# Patient Record
Sex: FEMALE | Race: WHITE | NOT HISPANIC OR LATINO | Employment: FULL TIME | URBAN - METROPOLITAN AREA
[De-identification: names, ages, dates, MRNs, and addresses within clinical notes are randomized per-mention and may not be internally consistent; named-entity substitution may affect disease eponyms.]

---

## 2017-08-30 ENCOUNTER — ALLSCRIPTS OFFICE VISIT (OUTPATIENT)
Dept: OTHER | Facility: OTHER | Age: 42
End: 2017-08-30

## 2017-08-30 DIAGNOSIS — Z12.31 ENCOUNTER FOR SCREENING MAMMOGRAM FOR MALIGNANT NEOPLASM OF BREAST: ICD-10-CM

## 2017-09-06 LAB
ADDITIONAL INFORMATION (HISTORICAL): NORMAL
ADEQUACY: (HISTORICAL): NORMAL
CYTOTECHNOLOGIST: (HISTORICAL): NORMAL
INTERPRETATION (HISTORICAL): NORMAL
LMP (HISTORICAL): NORMAL
PREV. BX: (HISTORICAL): NORMAL
PREV. PAP (HISTORICAL): NORMAL
SOURCE (HISTORICAL): NORMAL

## 2017-12-19 ENCOUNTER — HOSPITAL ENCOUNTER (OUTPATIENT)
Dept: MAMMOGRAPHY | Facility: MEDICAL CENTER | Age: 42
Discharge: HOME | End: 2017-12-19
Payer: COMMERCIAL

## 2017-12-19 DIAGNOSIS — Z12.31 ENCOUNTER FOR SCREENING MAMMOGRAM FOR MALIGNANT NEOPLASM OF BREAST: ICD-10-CM

## 2017-12-19 PROCEDURE — 77063 BREAST TOMOSYNTHESIS BI: CPT

## 2017-12-19 PROCEDURE — G0202 SCR MAMMO BI INCL CAD: HCPCS

## 2018-01-14 VITALS
DIASTOLIC BLOOD PRESSURE: 70 MMHG | SYSTOLIC BLOOD PRESSURE: 100 MMHG | HEIGHT: 63 IN | WEIGHT: 119 LBS | BODY MASS INDEX: 21.09 KG/M2

## 2018-09-05 ENCOUNTER — ANNUAL EXAM (OUTPATIENT)
Dept: OBGYN CLINIC | Facility: CLINIC | Age: 43
End: 2018-09-05
Payer: COMMERCIAL

## 2018-09-05 VITALS
BODY MASS INDEX: 21.17 KG/M2 | DIASTOLIC BLOOD PRESSURE: 70 MMHG | WEIGHT: 124 LBS | SYSTOLIC BLOOD PRESSURE: 112 MMHG | HEIGHT: 64 IN

## 2018-09-05 DIAGNOSIS — Z11.51 SCREENING FOR HPV (HUMAN PAPILLOMAVIRUS): ICD-10-CM

## 2018-09-05 DIAGNOSIS — Z01.419 ENCOUNTER FOR ANNUAL ROUTINE GYNECOLOGICAL EXAMINATION: Primary | ICD-10-CM

## 2018-09-05 DIAGNOSIS — Z30.41 ENCOUNTER FOR SURVEILLANCE OF CONTRACEPTIVE PILLS: ICD-10-CM

## 2018-09-05 DIAGNOSIS — Z92.89 HISTORY OF MAMMOGRAM: ICD-10-CM

## 2018-09-05 PROCEDURE — 87624 HPV HI-RISK TYP POOLED RSLT: CPT | Performed by: OBSTETRICS & GYNECOLOGY

## 2018-09-05 PROCEDURE — S0612 ANNUAL GYNECOLOGICAL EXAMINA: HCPCS | Performed by: OBSTETRICS & GYNECOLOGY

## 2018-09-05 PROCEDURE — G0145 SCR C/V CYTO,THINLAYER,RESCR: HCPCS | Performed by: OBSTETRICS & GYNECOLOGY

## 2018-09-05 RX ORDER — NORGESTIMATE AND ETHINYL ESTRADIOL 7DAYSX3 LO
1 KIT ORAL DAILY
Qty: 84 TABLET | Refills: 3 | Status: SHIPPED | OUTPATIENT
Start: 2018-09-05 | End: 2019-03-04 | Stop reason: SDUPTHER

## 2018-09-05 NOTE — PROGRESS NOTES
Assessment   Annual well-woman exam  Contraceptive management  No new gyn complaints or concerns        Plan   Screening mammography ordered  New OCPs [facility]   All questions answered  Await pap smear results  Subjective here for annual exam     Lisa Solis is a 38 y o  female  1 para 1 who presents for annual exam  Periods are regular every 28-30 days, lasting 3 days  Dysmenorrhea:none  Cyclic symptoms include none  No intermenstrual bleeding, spotting, or discharge  The patient reports that there is not domestic violence in her life  Current contraception: OCP (estrogen/progesterone)  History of abnormal Pap smear: no  Family history of uterine or ovarian cancer: no  Regular self breast exam: yes  History of abnormal mammogram: no  Family history of breast cancer: no  History of abnormal lipids: no  Menstrual History:  OB History     No data available         Menarche age: 17  No LMP recorded  Patient is not currently having periods (Reason: Birth Control)  Review of Systems  Pertinent items are noted in HPI        Objective no acute distress     /70 (BP Location: Right arm, Patient Position: Sitting, Cuff Size: Standard)   Ht 5' 4" (1 626 m)   Wt 56 2 kg (124 lb)   BMI 21 28 kg/m²     General:   alert and oriented, in no acute distress, alert, appears stated age and cooperative   Heart: regular rate and rhythm, S1, S2 normal, no murmur, click, rub or gallop   Lungs: clear to auscultation bilaterally   Abdomen: soft, non-tender, without masses or organomegaly   Vulva: normal, Bartholin's, Urethra, [place]'s normal, female escutcheon   Vagina: normal mucosa, normal discharge   Cervix: multiparous appearance, no cervical motion tenderness and no lesions   Uterus: normal size, mobile, non-tender, normal shape and consistency   Adnexa: normal adnexa   Breast exam bilateral breast exam in the sitting supine position with chaperone present no visible or palpable breast lesions identified no supraclavicular axillary lymphadenopathy noted no nipple discharge

## 2018-09-07 LAB
HPV HR 12 DNA CVX QL NAA+PROBE: NEGATIVE
HPV16 DNA CVX QL NAA+PROBE: POSITIVE
HPV18 DNA CVX QL NAA+PROBE: NEGATIVE

## 2018-09-10 LAB
LAB AP GYN PRIMARY INTERPRETATION: NORMAL
Lab: NORMAL

## 2018-09-17 ENCOUNTER — CLINICAL SUPPORT (OUTPATIENT)
Dept: FAMILY MEDICINE CLINIC | Facility: CLINIC | Age: 43
End: 2018-09-17
Payer: COMMERCIAL

## 2018-09-17 DIAGNOSIS — Z11.1 ENCOUNTER FOR PPD TEST: Primary | ICD-10-CM

## 2018-09-17 PROCEDURE — 86580 TB INTRADERMAL TEST: CPT

## 2018-09-20 ENCOUNTER — CLINICAL SUPPORT (OUTPATIENT)
Dept: FAMILY MEDICINE CLINIC | Facility: CLINIC | Age: 43
End: 2018-09-20

## 2018-09-20 DIAGNOSIS — Z11.1 ENCOUNTER FOR PPD SKIN TEST READING: Primary | ICD-10-CM

## 2018-09-20 LAB
INDURATION: 0 MM
TB SKIN TEST: NEGATIVE

## 2018-09-20 PROCEDURE — RECHECK

## 2018-09-27 ENCOUNTER — PROCEDURE VISIT (OUTPATIENT)
Dept: OBGYN CLINIC | Facility: CLINIC | Age: 43
End: 2018-09-27
Payer: COMMERCIAL

## 2018-09-27 VITALS
DIASTOLIC BLOOD PRESSURE: 65 MMHG | BODY MASS INDEX: 21.51 KG/M2 | HEIGHT: 64 IN | SYSTOLIC BLOOD PRESSURE: 90 MMHG | WEIGHT: 126 LBS

## 2018-09-27 DIAGNOSIS — B97.7 HIGH RISK HPV INFECTION: Primary | ICD-10-CM

## 2018-09-27 PROCEDURE — 88305 TISSUE EXAM BY PATHOLOGIST: CPT | Performed by: PATHOLOGY

## 2018-09-27 PROCEDURE — 57454 BX/CURETT OF CERVIX W/SCOPE: CPT | Performed by: OBSTETRICS & GYNECOLOGY

## 2018-09-27 NOTE — PROGRESS NOTES
Colposcopy  Date/Time: 9/27/2018 4:47 PM  Performed by: Bonny Gray by: Julio Hansen     Consent:     Consent obtained:  Written    Consent given by:  Patient    Procedural risks discussed:  Bleeding and infection    Patient questions answered: yes      Patient agrees, verbalizes understanding, and wants to proceed: yes      Educational handouts given: yes      Instructions and paperwork completed: yes    Pre-procedure:     Pre-procedure timeout performed: yes      Premeds:  Ibuprofen    Prepped with: acetic acid    Indication:     Indication:  ASC-H  Procedure:     Procedure: Colposcopy w/ cervical biopsy and ECC      Under satisfactory analgesia the patient was prepped and draped in the dorsal lithotomy position: yes      [place] speculum was placed in the vagina: yes      Under colposcopic examination the transition zone was seen in entirety: yes      Intracervical block was performed: no      Endocervix was curetted using a Kevorkian curette: yes      Cervical biopsy performed with a cervical biopsy punch: yes      Tampon inserted: no      Monsel's solution was applied: yes      Biopsy(s): yes      Location:  6, 9, 12, 3 o'clock    Specimen to pathology: yes    Post-procedure:     Findings: Bleeding, Friable cervix, Punctation and White epithelium      Impression: High grade cervical dysplasia      Patient tolerance of procedure:   Tolerated well, no immediate complications

## 2018-10-11 ENCOUNTER — OFFICE VISIT (OUTPATIENT)
Dept: OBGYN CLINIC | Facility: CLINIC | Age: 43
End: 2018-10-11
Payer: COMMERCIAL

## 2018-10-11 VITALS
DIASTOLIC BLOOD PRESSURE: 68 MMHG | SYSTOLIC BLOOD PRESSURE: 100 MMHG | WEIGHT: 125.2 LBS | HEIGHT: 64 IN | BODY MASS INDEX: 21.37 KG/M2

## 2018-10-11 DIAGNOSIS — B97.7 HIGH RISK HPV INFECTION: Primary | ICD-10-CM

## 2018-10-11 PROCEDURE — 99213 OFFICE O/P EST LOW 20 MIN: CPT | Performed by: OBSTETRICS & GYNECOLOGY

## 2018-10-11 NOTE — PROGRESS NOTES
Assessment/Plan:    Diagnoses and all orders for this visit:    High risk HPV infection        Subjective:  Here for follow-up and test results     Patient ID: Danita Anne is a 41 y o  female  HPI   81-year-old female recently here for annual exam several weeks ago  Her Pap smear was normal however HPV testing was positive for HPV 16 and patient was here for colposcopy  Patient did have numerous nabothian cyst on her cervix and it was quite friable during the procedure  ECC and 4/4 biopsies were all negative  Discussed options of treatment, surveillance versus active management  Patient was interested in cryocautery  Recommend she return within the next 2 weeks for cryocautery of the cervix  Will follow with serial Pap test over the next year after this has been completed  Review of Systems   All other systems reviewed and are negative  Objective:  No acute distress     Physical Exam   Constitutional: She is oriented to person, place, and time  She appears well-developed and well-nourished  HENT:   Head: Normocephalic  Eyes: Pupils are equal, round, and reactive to light  Neck: Normal range of motion  Genitourinary:   Genitourinary Comments: Pelvic exam deferred   Musculoskeletal: Normal range of motion  Neurological: She is alert and oriented to person, place, and time  Skin: Skin is warm and dry  Psychiatric: She has a normal mood and affect   Her behavior is normal  Judgment and thought content normal

## 2018-10-24 ENCOUNTER — PROCEDURE VISIT (OUTPATIENT)
Dept: OBGYN CLINIC | Facility: CLINIC | Age: 43
End: 2018-10-24
Payer: COMMERCIAL

## 2018-10-24 VITALS
WEIGHT: 124.4 LBS | DIASTOLIC BLOOD PRESSURE: 68 MMHG | SYSTOLIC BLOOD PRESSURE: 110 MMHG | BODY MASS INDEX: 21.24 KG/M2 | HEIGHT: 64 IN

## 2018-10-24 DIAGNOSIS — B97.7 HIGH RISK HPV INFECTION: Primary | ICD-10-CM

## 2018-10-24 PROCEDURE — 57511 CRYOCAUTERY OF CERVIX: CPT | Performed by: OBSTETRICS & GYNECOLOGY

## 2018-10-24 NOTE — PROGRESS NOTES
ProceduresCryocautery of the cervix  High-risk HPV infection  Colposcopy biopsies inconclusive  Patient request cryocautery freezing of her cervix  Freeze x3 minutes  Follow-up x5 minute  Freeze for additional 3 min  Patient tolerated well there were no complications    Return in 2 weeks for follow-up

## 2018-11-07 ENCOUNTER — OFFICE VISIT (OUTPATIENT)
Dept: OBGYN CLINIC | Facility: CLINIC | Age: 43
End: 2018-11-07
Payer: COMMERCIAL

## 2018-11-07 VITALS
BODY MASS INDEX: 21.51 KG/M2 | SYSTOLIC BLOOD PRESSURE: 110 MMHG | WEIGHT: 126 LBS | DIASTOLIC BLOOD PRESSURE: 70 MMHG | HEIGHT: 64 IN

## 2018-11-07 DIAGNOSIS — B97.7 HIGH RISK HPV INFECTION: Primary | ICD-10-CM

## 2018-11-07 PROCEDURE — 99213 OFFICE O/P EST LOW 20 MIN: CPT | Performed by: OBSTETRICS & GYNECOLOGY

## 2018-11-07 NOTE — PROGRESS NOTES
Assessment/Plan:    Diagnoses and all orders for this visit:    High risk HPV infection        Subjective:  Cryocautery follow-up     Patient ID: Perry Mancilla is a 41 y o  female  HPI   Patient identified to have high-risk HPV 18 with negative Pap at her recent annual exam   Follow-up colposcopy was inconclusive  Patient requested treatment with cryocautery  Patient cryocautery 2 weeks ago do well and not having any significant negative side effects  Here for re-examination  Review of Systems   All other systems reviewed and are negative  Objective:  /70, height 5 foot 4, weight 126 lb     Physical Exam   Constitutional: She is oriented to person, place, and time  She appears well-developed and well-nourished  HENT:   Head: Normocephalic  Eyes: Pupils are equal, round, and reactive to light  Neck: Normal range of motion  Genitourinary:   Genitourinary Comments: Pelvic exam  Normal external genitalia  Cervix seems to be healing normally  Nabothian cyst present  No pelvic masses otherwise normal exam     Musculoskeletal: Normal range of motion  Neurological: She is alert and oriented to person, place, and time  Skin: Skin is warm and dry  Psychiatric: She has a normal mood and affect

## 2019-03-04 ENCOUNTER — OFFICE VISIT (OUTPATIENT)
Dept: OBGYN CLINIC | Facility: CLINIC | Age: 44
End: 2019-03-04
Payer: COMMERCIAL

## 2019-03-04 VITALS
HEIGHT: 64 IN | BODY MASS INDEX: 21.34 KG/M2 | SYSTOLIC BLOOD PRESSURE: 114 MMHG | DIASTOLIC BLOOD PRESSURE: 70 MMHG | WEIGHT: 125 LBS

## 2019-03-04 DIAGNOSIS — Z30.41 ENCOUNTER FOR SURVEILLANCE OF CONTRACEPTIVE PILLS: ICD-10-CM

## 2019-03-04 DIAGNOSIS — B97.7 HIGH RISK HPV INFECTION: Primary | ICD-10-CM

## 2019-03-04 PROCEDURE — 87624 HPV HI-RISK TYP POOLED RSLT: CPT | Performed by: OBSTETRICS & GYNECOLOGY

## 2019-03-04 PROCEDURE — 99213 OFFICE O/P EST LOW 20 MIN: CPT | Performed by: OBSTETRICS & GYNECOLOGY

## 2019-03-04 PROCEDURE — 88175 CYTOPATH C/V AUTO FLUID REDO: CPT | Performed by: OBSTETRICS & GYNECOLOGY

## 2019-03-04 RX ORDER — NORGESTIMATE AND ETHINYL ESTRADIOL 7DAYSX3 LO
1 KIT ORAL DAILY
Qty: 84 TABLET | Refills: 3 | Status: SHIPPED | OUTPATIENT
Start: 2019-03-04 | End: 2019-06-10 | Stop reason: SDUPTHER

## 2019-03-04 NOTE — PROGRESS NOTES
Assessment/Plan:    Diagnoses and all orders for this visit:    High risk HPV infection  -     Liquid-based pap, diagnostic    Encounter for surveillance of contraceptive pills  -     norgestimate-ethinyl estradiol (ORTHO TRI-CYCLEN LO) 0 18/0 215/0 25 MG-25 MCG per tablet; Take 1 tablet by mouth daily for 84 days      Subjective:  Here for follow-up     Patient ID: Vanessa Noble is a 41 y o  female  HPI   51-year-old female  1 para 1 with history of recurrent high-risk HPV infection  Patient did have cryocautery done about 3 months ago previous that she had a colposcopy all biopsies were negative for dysplasia  This is her 1st Pap since the cryocautery  She feels well  She is currently on birth control to control her menstrual cycle as not having any problems at this time  Is not currently sexually active  Denies any pelvic pain symptoms  She will return in 3 months for repeat Pap  Review of Systems   All other systems reviewed and are negative  Objective:  /70, 5 foot 4 inches, 125 lb, no acute distress  Physical Exam   Constitutional: She is oriented to person, place, and time  She appears well-developed and well-nourished  HENT:   Head: Normocephalic  Eyes: Pupils are equal, round, and reactive to light  Neck: Normal range of motion  Genitourinary:   Genitourinary Comments: Normal external genitalia  Normal size uterus  Normal cervix  No CMT  No pelvic masses  Repeat Pap taken   Musculoskeletal: Normal range of motion  Neurological: She is alert and oriented to person, place, and time  Skin: Skin is warm and dry  Psychiatric: She has a normal mood and affect

## 2019-03-07 LAB
LAB AP GYN PRIMARY INTERPRETATION: NORMAL
LAB AP LMP: NORMAL
Lab: NORMAL

## 2019-06-10 ENCOUNTER — OFFICE VISIT (OUTPATIENT)
Dept: OBGYN CLINIC | Facility: CLINIC | Age: 44
End: 2019-06-10
Payer: COMMERCIAL

## 2019-06-10 VITALS
HEIGHT: 64 IN | DIASTOLIC BLOOD PRESSURE: 64 MMHG | BODY MASS INDEX: 21.82 KG/M2 | SYSTOLIC BLOOD PRESSURE: 110 MMHG | WEIGHT: 127.8 LBS

## 2019-06-10 DIAGNOSIS — R87.810 CERVICAL HIGH RISK HPV (HUMAN PAPILLOMAVIRUS) TEST POSITIVE: Primary | ICD-10-CM

## 2019-06-10 DIAGNOSIS — Z30.41 ENCOUNTER FOR SURVEILLANCE OF CONTRACEPTIVE PILLS: ICD-10-CM

## 2019-06-10 PROCEDURE — 99213 OFFICE O/P EST LOW 20 MIN: CPT | Performed by: OBSTETRICS & GYNECOLOGY

## 2019-06-10 PROCEDURE — 88175 CYTOPATH C/V AUTO FLUID REDO: CPT | Performed by: OBSTETRICS & GYNECOLOGY

## 2019-06-10 PROCEDURE — 87624 HPV HI-RISK TYP POOLED RSLT: CPT | Performed by: OBSTETRICS & GYNECOLOGY

## 2019-06-10 RX ORDER — NORGESTIMATE AND ETHINYL ESTRADIOL 7DAYSX3 LO
1 KIT ORAL DAILY
Qty: 84 TABLET | Refills: 3 | Status: SHIPPED | OUTPATIENT
Start: 2019-06-10 | End: 2019-12-10 | Stop reason: SDUPTHER

## 2019-06-12 LAB
HPV HR 12 DNA CVX QL NAA+PROBE: NEGATIVE
HPV16 DNA CVX QL NAA+PROBE: NEGATIVE
HPV18 DNA CVX QL NAA+PROBE: NEGATIVE

## 2019-06-18 LAB
LAB AP GYN PRIMARY INTERPRETATION: NORMAL
LAB AP LMP: NORMAL
Lab: NORMAL

## 2019-08-19 ENCOUNTER — OFFICE VISIT (OUTPATIENT)
Dept: FAMILY MEDICINE CLINIC | Facility: CLINIC | Age: 44
End: 2019-08-19
Payer: COMMERCIAL

## 2019-08-19 VITALS
DIASTOLIC BLOOD PRESSURE: 70 MMHG | WEIGHT: 126 LBS | SYSTOLIC BLOOD PRESSURE: 112 MMHG | BODY MASS INDEX: 20.99 KG/M2 | HEART RATE: 76 BPM | RESPIRATION RATE: 16 BRPM | HEIGHT: 65 IN | TEMPERATURE: 98.3 F

## 2019-08-19 DIAGNOSIS — J01.00 ACUTE MAXILLARY SINUSITIS, RECURRENCE NOT SPECIFIED: Primary | ICD-10-CM

## 2019-08-19 PROCEDURE — 3008F BODY MASS INDEX DOCD: CPT | Performed by: FAMILY MEDICINE

## 2019-08-19 PROCEDURE — 1036F TOBACCO NON-USER: CPT | Performed by: FAMILY MEDICINE

## 2019-08-19 PROCEDURE — 99213 OFFICE O/P EST LOW 20 MIN: CPT | Performed by: FAMILY MEDICINE

## 2019-08-19 RX ORDER — AZITHROMYCIN 250 MG/1
TABLET, FILM COATED ORAL
Qty: 6 TABLET | Refills: 0 | Status: SHIPPED | OUTPATIENT
Start: 2019-08-19 | End: 2019-08-23

## 2019-08-19 NOTE — PROGRESS NOTES
Assessment/Plan:  Return to the office in 1 week or sooner p r n  Katherine Khan Diagnoses and all orders for this visit:    Acute maxillary sinusitis, recurrence not specified  Comments:  Z-Geoff and Robitussin DM or Mucinex DM p r n   Increase fluids and rest   Orders:  -     azithromycin (ZITHROMAX) 250 mg tablet; Take 2 tablets today then 1 tablet daily x 4 days          Subjective:      Patient ID: Ina Gaston is a 41 y o  female  Patient started 2 weeks ago with cold symptoms  She complains of nasal congestion productive of yellow mucus, sore throat and cough  Patient has felt feverish and complains of sinus pressure headache  She has treated this with Robitussin DM, Tylenol, ibuprofen and Cherelle pot with some relief  URI    This is a new problem  The current episode started 1 to 4 weeks ago  The problem has been gradually worsening  Associated symptoms include congestion, coughing, headaches, a plugged ear sensation, rhinorrhea, sinus pain and a sore throat  Pertinent negatives include no ear pain, sneezing or wheezing  She has tried acetaminophen, NSAIDs and increased fluids (robitussin DM) for the symptoms  The treatment provided mild relief  The following portions of the patient's history were reviewed and updated as appropriate: allergies, current medications, past family history, past medical history, past social history, past surgical history and problem list     Review of Systems   HENT: Positive for congestion, rhinorrhea, sinus pain and sore throat  Negative for ear pain and sneezing  Respiratory: Positive for cough  Negative for wheezing  Neurological: Positive for headaches  Objective:      /70 (BP Location: Left arm, Patient Position: Sitting, Cuff Size: Adult)   Pulse 76   Temp 98 3 °F (36 8 °C) (Oral)   Resp 16   Ht 5' 4 57" (1 64 m)   Wt 57 2 kg (126 lb)   BMI 21 25 kg/m²          Physical Exam   Constitutional: She is oriented to person, place, and time   She appears well-developed and well-nourished  HENT:   Head: Normocephalic  Right Ear: External ear normal    Left Ear: External ear normal    Positive turbinates swelling with mucoid purulent drainage  Throat postnasal drainage and erythema  Eyes: Conjunctivae are normal  No scleral icterus  Neck: Neck supple  Cardiovascular: Normal rate and regular rhythm  Pulmonary/Chest: Effort normal and breath sounds normal    Abdominal: Soft  There is no tenderness  Musculoskeletal: She exhibits no edema  Lymphadenopathy:     She has no cervical adenopathy  Neurological: She is alert and oriented to person, place, and time  Skin: Skin is warm and dry  Psychiatric: She has a normal mood and affect

## 2019-09-10 ENCOUNTER — OFFICE VISIT (OUTPATIENT)
Dept: OBGYN CLINIC | Facility: CLINIC | Age: 44
End: 2019-09-10
Payer: COMMERCIAL

## 2019-09-10 VITALS
DIASTOLIC BLOOD PRESSURE: 70 MMHG | HEIGHT: 64 IN | SYSTOLIC BLOOD PRESSURE: 120 MMHG | BODY MASS INDEX: 22.09 KG/M2 | WEIGHT: 129.4 LBS

## 2019-09-10 DIAGNOSIS — Z01.419 WOMEN'S ANNUAL ROUTINE GYNECOLOGICAL EXAMINATION: Primary | ICD-10-CM

## 2019-09-10 DIAGNOSIS — N87.0 CERVICAL DYSPLASIA, MILD: ICD-10-CM

## 2019-09-10 DIAGNOSIS — Z86.19 HISTORY OF HUMAN PAPILLOMA VIRUS: ICD-10-CM

## 2019-09-10 DIAGNOSIS — R92.2 DENSE BREAST TISSUE ON MAMMOGRAM: ICD-10-CM

## 2019-09-10 PROCEDURE — S0612 ANNUAL GYNECOLOGICAL EXAMINA: HCPCS | Performed by: OBSTETRICS & GYNECOLOGY

## 2019-09-10 PROCEDURE — G0145 SCR C/V CYTO,THINLAYER,RESCR: HCPCS | Performed by: OBSTETRICS & GYNECOLOGY

## 2019-09-10 PROCEDURE — 87624 HPV HI-RISK TYP POOLED RSLT: CPT | Performed by: OBSTETRICS & GYNECOLOGY

## 2019-09-10 NOTE — PROGRESS NOTES
Assessment   Annual well-woman exam  History of high-risk HPV  Status post cryocautery of the cervix 2018  History of dense breast tissue on mammogram       Plan   Follow-up screening mammography ordered  Repeat Pap in 3 months   All questions answered  Await pap smear results  Subjective here for follow-up Pap and annual exam     Sole Villagran is a 41 y o  female  1 para 1 who presents for annual exam  Periods are regular every 28-30 days, lasting 5 days  Dysmenorrhea:none  Cyclic symptoms include none  No intermenstrual bleeding, spotting, or discharge  The patient reports that there is not domestic violence in her life  Current contraception: OCP (estrogen/progesterone)  History of abnormal Pap smear: yes - high-risk HPV  Family history of uterine or ovarian cancer: no  Regular self breast exam: yes  History of abnormal mammogram: no  Family history of breast cancer: no  History of abnormal lipids: no  Menstrual History:  OB History        2    Para   1    Term                AB        Living           SAB        TAB        Ectopic        Multiple        Live Births                    Menarche age: 17  Patient's last menstrual period was 2019 (exact date)  Review of Systems  Pertinent items are noted in HPI        Objective no acute distress   /70 (BP Location: Right arm, Patient Position: Sitting, Cuff Size: Standard)   Ht 5' 4" (1 626 m)   Wt 58 7 kg (129 lb 6 4 oz)   LMP 2019 (Exact Date)   BMI 22 21 kg/m²     General:   alert and oriented, in no acute distress, alert, appears stated age and cooperative   Heart: regular rate and rhythm, S1, S2 normal, no murmur, click, rub or gallop   Lungs: clear to auscultation bilaterally   Abdomen: soft, non-tender, without masses or organomegaly   Vulva: normal, Bartholin's, Urethra, [place]'s normal, female escutcheon   Vagina: normal mucosa, normal discharge   Cervix: multiparous appearance, no bleeding following Pap, no cervical motion tenderness and no lesions   Uterus: normal size, mobile, non-tender, normal shape and consistency   Adnexa: normal adnexa and no mass, fullness, tenderness   Bilateral breast exam in the sitting and supine position with chaperone present, no visible or palpable breast lesions identified  No breast masses noted  No supraclavicular or axillary lymphadenopathy noted  No nipple discharge  Reviewed self-breast exam techniques  Rectal exam, no rectal masses, normal tone, no hemorrhoids visible or palpable  Hemoccult negative          OB History        2    Para   1    Term                AB        Living           SAB        TAB        Ectopic        Multiple        Live Births

## 2019-09-13 LAB
LAB AP GYN PRIMARY INTERPRETATION: NORMAL
LAB AP LMP: NORMAL
Lab: NORMAL
PATH INTERP SPEC-IMP: NORMAL

## 2019-09-14 DIAGNOSIS — N76.0 BV (BACTERIAL VAGINOSIS): Primary | ICD-10-CM

## 2019-09-14 DIAGNOSIS — B96.89 BV (BACTERIAL VAGINOSIS): Primary | ICD-10-CM

## 2019-09-14 RX ORDER — METRONIDAZOLE 7.5 MG/G
1 GEL VAGINAL
Qty: 5 G | Refills: 0 | Status: SHIPPED | OUTPATIENT
Start: 2019-09-14 | End: 2019-09-19

## 2019-10-29 ENCOUNTER — HOSPITAL ENCOUNTER (OUTPATIENT)
Dept: RADIOLOGY | Age: 44
Discharge: HOME | End: 2019-10-29
Payer: COMMERCIAL

## 2019-10-29 VITALS — WEIGHT: 129 LBS | BODY MASS INDEX: 22.02 KG/M2 | HEIGHT: 64 IN

## 2019-10-29 DIAGNOSIS — Z12.31 ENCOUNTER FOR SCREENING MAMMOGRAM FOR MALIGNANT NEOPLASM OF BREAST: ICD-10-CM

## 2019-10-29 PROCEDURE — 77063 BREAST TOMOSYNTHESIS BI: CPT

## 2019-10-29 PROCEDURE — 77067 SCR MAMMO BI INCL CAD: CPT

## 2019-10-30 DIAGNOSIS — R92.8 ABNORMALITY OF RIGHT BREAST ON SCREENING MAMMOGRAM: Primary | ICD-10-CM

## 2019-11-07 ENCOUNTER — HOSPITAL ENCOUNTER (OUTPATIENT)
Dept: MAMMOGRAPHY | Facility: CLINIC | Age: 44
Discharge: HOME | End: 2019-11-07
Payer: COMMERCIAL

## 2019-11-07 ENCOUNTER — TELEPHONE (OUTPATIENT)
Dept: OBGYN CLINIC | Facility: CLINIC | Age: 44
End: 2019-11-07

## 2019-11-07 ENCOUNTER — HOSPITAL ENCOUNTER (OUTPATIENT)
Dept: ULTRASOUND IMAGING | Facility: CLINIC | Age: 44
Discharge: HOME | End: 2019-11-07
Payer: COMMERCIAL

## 2019-11-07 VITALS — BODY MASS INDEX: 22.02 KG/M2 | HEIGHT: 64 IN | WEIGHT: 129 LBS

## 2019-11-07 DIAGNOSIS — R92.8 ABNORMAL MAMMOGRAM: ICD-10-CM

## 2019-11-07 DIAGNOSIS — R92.8 ABNORMALITY OF BOTH BREASTS ON SCREENING MAMMOGRAPHY: Primary | ICD-10-CM

## 2019-11-07 PROCEDURE — 77065 DX MAMMO INCL CAD UNI: CPT

## 2019-11-07 PROCEDURE — G0279 TOMOSYNTHESIS, MAMMO: HCPCS

## 2019-11-07 PROCEDURE — 76642 ULTRASOUND BREAST LIMITED: CPT

## 2019-11-07 NOTE — TELEPHONE ENCOUNTER
----- Message from [facility],  sent at 11/7/2019 12:44 PM EST -----  Six-month follow-up diagnostic mammography of right breast recommended    Order has been placed in the system

## 2019-11-13 ENCOUNTER — TRANSCRIBE ORDERS (OUTPATIENT)
Dept: ADMINISTRATIVE | Facility: HOSPITAL | Age: 44
End: 2019-11-13

## 2019-11-13 DIAGNOSIS — R92.8 ABNORMALITY OF BOTH BREASTS ON SCREENING MAMMOGRAPHY: Primary | ICD-10-CM

## 2019-12-10 ENCOUNTER — OFFICE VISIT (OUTPATIENT)
Dept: OBGYN CLINIC | Facility: CLINIC | Age: 44
End: 2019-12-10
Payer: COMMERCIAL

## 2019-12-10 VITALS
BODY MASS INDEX: 21.24 KG/M2 | SYSTOLIC BLOOD PRESSURE: 116 MMHG | DIASTOLIC BLOOD PRESSURE: 82 MMHG | HEIGHT: 64 IN | WEIGHT: 124.4 LBS

## 2019-12-10 DIAGNOSIS — B97.7 HIGH RISK HPV INFECTION: ICD-10-CM

## 2019-12-10 DIAGNOSIS — N87.0 CERVICAL DYSPLASIA, MILD: Primary | ICD-10-CM

## 2019-12-10 DIAGNOSIS — Z30.41 ENCOUNTER FOR SURVEILLANCE OF CONTRACEPTIVE PILLS: ICD-10-CM

## 2019-12-10 PROCEDURE — 99213 OFFICE O/P EST LOW 20 MIN: CPT | Performed by: OBSTETRICS & GYNECOLOGY

## 2019-12-10 PROCEDURE — 88175 CYTOPATH C/V AUTO FLUID REDO: CPT | Performed by: OBSTETRICS & GYNECOLOGY

## 2019-12-10 RX ORDER — NORGESTIMATE AND ETHINYL ESTRADIOL 7DAYSX3 LO
1 KIT ORAL DAILY
Qty: 84 TABLET | Refills: 3 | Status: SHIPPED | OUTPATIENT
Start: 2019-12-10 | End: 2020-03-03

## 2019-12-10 NOTE — PROGRESS NOTES
Assessment/Plan:    Diagnoses and all orders for this visit:    Cervical dysplasia, mild    Encounter for surveillance of contraceptive pills  -     norgestimate-ethinyl estradiol (ORTHO TRI-CYCLEN LO) 0 18/0 215/0 25 MG-25 MCG per tablet; Take 1 tablet by mouth daily    High risk HPV infection    Other orders  -     Liquid-based pap, diagnostic        Subjective:  Here for repeat Pap     Patient ID: Fletcher Devine is a 45 y o  female  HPI   57-year-old female  1 para 1 history of cervical dysplasia and high-risk HPV  Patient underwent cryocautery last year in October subsequent follow-up Paps have remained stable and negative  Last 3 Paps HPV was not identified  Repeat Pap today  Patient otherwise on OCPs to regulate her menstrual cycle  Denies pelvic pain dysmenorrhea or dyspareunia  Not currently sexually active  Recommend follow-up 6 months with annual exam     Review of Systems   Respiratory: Negative  Cardiovascular: Negative  Gastrointestinal: Negative  Genitourinary: Negative for dyspareunia and pelvic pain  Neurological: Negative  All other systems reviewed and are negative  Objective:  No acute distress  /82 (BP Location: Right arm, Patient Position: Sitting, Cuff Size: Standard)   Ht 5' 4" (1 626 m)   Wt 56 4 kg (124 lb 6 4 oz)   LMP 2019   BMI 21 35 kg/m²      Physical Exam   Constitutional: She is oriented to person, place, and time  She appears well-developed and well-nourished  HENT:   Head: Normocephalic  Eyes: Pupils are equal, round, and reactive to light  Neck: Normal range of motion  Pulmonary/Chest: Effort normal    Abdominal: Soft  Genitourinary:   Genitourinary Comments: Normal external genitalia  Normal size uterus  Cervix enlarged and irregular with nabothian cyst present  Repeat Pap collected  Normal discharge  Normal bimanual exam  No CMT  No pelvic masses   Musculoskeletal: Normal range of motion     Neurological: She is alert and oriented to person, place, and time  Skin: Skin is warm and dry  Psychiatric: She has a normal mood and affect  Her behavior is normal  Thought content normal    Vitals reviewed

## 2019-12-13 LAB
LAB AP GYN PRIMARY INTERPRETATION: NORMAL
LAB AP LMP: NORMAL
Lab: NORMAL

## 2020-01-15 ENCOUNTER — OFFICE VISIT (OUTPATIENT)
Dept: FAMILY MEDICINE CLINIC | Facility: CLINIC | Age: 45
End: 2020-01-15
Payer: COMMERCIAL

## 2020-01-15 VITALS
WEIGHT: 128 LBS | OXYGEN SATURATION: 97 % | RESPIRATION RATE: 16 BRPM | DIASTOLIC BLOOD PRESSURE: 74 MMHG | HEIGHT: 64 IN | BODY MASS INDEX: 21.85 KG/M2 | HEART RATE: 82 BPM | SYSTOLIC BLOOD PRESSURE: 114 MMHG | TEMPERATURE: 99.6 F

## 2020-01-15 DIAGNOSIS — L25.9 CONTACT DERMATITIS, UNSPECIFIED CONTACT DERMATITIS TYPE, UNSPECIFIED TRIGGER: Primary | ICD-10-CM

## 2020-01-15 PROCEDURE — 99213 OFFICE O/P EST LOW 20 MIN: CPT | Performed by: FAMILY MEDICINE

## 2020-01-15 PROCEDURE — 1036F TOBACCO NON-USER: CPT | Performed by: FAMILY MEDICINE

## 2020-01-15 PROCEDURE — 3008F BODY MASS INDEX DOCD: CPT | Performed by: FAMILY MEDICINE

## 2020-01-15 RX ORDER — IBUPROFEN 800 MG
TABLET ORAL
COMMUNITY

## 2020-01-15 RX ORDER — CALCIUM/MAGNESIUM/ZINC 333-133 MG
1 TABLET ORAL 2 TIMES DAILY
COMMUNITY

## 2020-01-15 RX ORDER — MULTIVITAMIN
1 TABLET ORAL DAILY
COMMUNITY

## 2020-01-15 RX ORDER — TRIAMCINOLONE ACETONIDE 1 MG/G
CREAM TOPICAL 2 TIMES DAILY
Qty: 30 G | Refills: 0 | Status: SHIPPED | OUTPATIENT
Start: 2020-01-15

## 2020-01-15 NOTE — PROGRESS NOTES
Assessment/Plan:  Patient will be started on triamcinolone 0 1% cream apply b i d  P r n  Recommend moisturizing cream daily  Return to the office in 1-2 weeks or call sooner p r n   If symptoms persist discussed referral to Dermatology  Diagnoses and all orders for this visit:    Contact dermatitis, unspecified contact dermatitis type, unspecified trigger  -     triamcinolone (KENALOG) 0 1 % cream; Apply topically 2 (two) times a day    Other orders  -     ASHWAGANDHA PO; Take 450 mg by mouth 2 (two) times a day  -     Omega-3 Fatty Acids (FISH OIL PO); Take by mouth  -     Echinacea 400 MG CAPS; Take 1 capsule by mouth 2 (two) times a day  -     Cholecalciferol (VITAMIN D3) 10 MCG (400 UNIT) CAPS; Take by mouth  -     CALCIUM PO; Take by mouth  -     Multiple Vitamin (MULTIVITAMIN) tablet; Take 1 tablet by mouth daily          Subjective:      Patient ID: Pedro Pablo Hood is a 45 y o  female  Patient complains of itchy, irritated rash on her elbows and hands for the past 5 days  No new soaps or laundry detergents  No known exposures  She has treated this with OTC hydrocortisone cream and anti-itch cream with some relief  Rash   This is a new problem  The current episode started in the past 7 days  The problem has been gradually worsening since onset  The affected locations include the left elbow, right elbow, left hand and right hand  The rash is characterized by redness and itchiness  She was exposed to nothing  Pertinent negatives include no congestion, cough, fatigue, fever or sore throat  Past treatments include topical steroids, anti-itch cream and moisturizer (HC cream)  The treatment provided mild relief  There is no history of allergies, asthma or eczema         The following portions of the patient's history were reviewed and updated as appropriate: allergies, current medications, past family history, past medical history, past social history, past surgical history and problem list     Review of Systems   Constitutional: Negative for fatigue and fever  HENT: Negative for congestion and sore throat  Respiratory: Negative for cough  Skin: Positive for rash  Objective:      /74   Pulse 82   Temp 99 6 °F (37 6 °C) (Tympanic)   Resp 16   Ht 5' 4" (1 626 m)   Wt 58 1 kg (128 lb)   SpO2 97%   BMI 21 97 kg/m²          Physical Exam   Constitutional: She is oriented to person, place, and time  She appears well-developed and well-nourished  No distress  HENT:   Head: Normocephalic  Right Ear: External ear normal    Left Ear: External ear normal    Nose: Nose normal    Mouth/Throat: Oropharynx is clear and moist    Eyes: Conjunctivae are normal  No scleral icterus  Neck: Neck supple  Cardiovascular: Normal rate and regular rhythm  Pulmonary/Chest: Effort normal and breath sounds normal    Abdominal: Soft  There is no tenderness  Musculoskeletal: She exhibits no edema  Lymphadenopathy:     She has no cervical adenopathy  Neurological: She is alert and oriented to person, place, and time  Skin: Skin is warm and dry  Rash noted  Erythematous, papular rash on bilateral elbows and dorsal aspect of hands  Psychiatric: She has a normal mood and affect

## 2020-06-30 ENCOUNTER — ANNUAL EXAM (OUTPATIENT)
Dept: OBGYN CLINIC | Facility: CLINIC | Age: 45
End: 2020-06-30
Payer: COMMERCIAL

## 2020-06-30 VITALS
HEIGHT: 64 IN | WEIGHT: 122 LBS | DIASTOLIC BLOOD PRESSURE: 74 MMHG | BODY MASS INDEX: 20.83 KG/M2 | SYSTOLIC BLOOD PRESSURE: 118 MMHG

## 2020-06-30 DIAGNOSIS — B97.7 HIGH RISK HPV INFECTION: ICD-10-CM

## 2020-06-30 DIAGNOSIS — Z30.41 ENCOUNTER FOR SURVEILLANCE OF CONTRACEPTIVE PILLS: ICD-10-CM

## 2020-06-30 DIAGNOSIS — N87.1 CERVICAL DYSPLASIA, MODERATE: ICD-10-CM

## 2020-06-30 DIAGNOSIS — N92.0 MENORRHAGIA WITH REGULAR CYCLE: Primary | ICD-10-CM

## 2020-06-30 PROCEDURE — 99213 OFFICE O/P EST LOW 20 MIN: CPT | Performed by: OBSTETRICS & GYNECOLOGY

## 2020-06-30 PROCEDURE — 87624 HPV HI-RISK TYP POOLED RSLT: CPT | Performed by: OBSTETRICS & GYNECOLOGY

## 2020-06-30 PROCEDURE — 88175 CYTOPATH C/V AUTO FLUID REDO: CPT | Performed by: OBSTETRICS & GYNECOLOGY

## 2020-06-30 PROCEDURE — 3008F BODY MASS INDEX DOCD: CPT | Performed by: OBSTETRICS & GYNECOLOGY

## 2020-06-30 PROCEDURE — 1036F TOBACCO NON-USER: CPT | Performed by: OBSTETRICS & GYNECOLOGY

## 2020-06-30 RX ORDER — NORGESTIMATE AND ETHINYL ESTRADIOL
1 KIT DAILY
Qty: 84 TABLET | Refills: 1 | Status: SHIPPED | OUTPATIENT
Start: 2020-06-30 | End: 2020-12-15 | Stop reason: SDUPTHER

## 2020-06-30 RX ORDER — NORGESTIMATE AND ETHINYL ESTRADIOL
1 KIT DAILY
COMMUNITY
Start: 2020-05-29 | End: 2020-06-30 | Stop reason: SDUPTHER

## 2020-07-02 LAB
LAB AP GYN PRIMARY INTERPRETATION: NORMAL
LAB AP LMP: NORMAL
Lab: NORMAL

## 2020-08-11 ENCOUNTER — HOSPITAL ENCOUNTER (OUTPATIENT)
Dept: MAMMOGRAPHY | Facility: CLINIC | Age: 45
Discharge: HOME | End: 2020-08-11
Payer: COMMERCIAL

## 2020-08-11 ENCOUNTER — HOSPITAL ENCOUNTER (OUTPATIENT)
Dept: ULTRASOUND IMAGING | Facility: CLINIC | Age: 45
Discharge: HOME | End: 2020-08-11
Payer: COMMERCIAL

## 2020-08-11 VITALS — HEIGHT: 64 IN | BODY MASS INDEX: 20.83 KG/M2 | TEMPERATURE: 97.5 F | WEIGHT: 122 LBS

## 2020-08-11 DIAGNOSIS — R92.8 ABNORMAL FINDING ON MAMMOGRAPHY: ICD-10-CM

## 2020-08-11 DIAGNOSIS — R92.8 ABNORMALITY OF BOTH BREASTS ON SCREENING MAMMOGRAPHY: ICD-10-CM

## 2020-08-11 PROCEDURE — 76642 ULTRASOUND BREAST LIMITED: CPT

## 2020-08-11 PROCEDURE — G0279 TOMOSYNTHESIS, MAMMO: HCPCS

## 2020-08-11 PROCEDURE — 77065 DX MAMMO INCL CAD UNI: CPT

## 2020-08-16 DIAGNOSIS — R92.2 DENSE BREAST TISSUE ON MAMMOGRAM: Primary | ICD-10-CM

## 2020-08-19 ENCOUNTER — TELEPHONE (OUTPATIENT)
Dept: OBGYN CLINIC | Facility: CLINIC | Age: 45
End: 2020-08-19

## 2020-08-19 DIAGNOSIS — Z12.31 SCREENING MAMMOGRAM, ENCOUNTER FOR: ICD-10-CM

## 2020-08-19 DIAGNOSIS — R92.2 DENSE BREAST TISSUE: Primary | ICD-10-CM

## 2020-10-30 ENCOUNTER — TELEPHONE (OUTPATIENT)
Dept: OBGYN CLINIC | Facility: CLINIC | Age: 45
End: 2020-10-30

## 2020-11-03 ENCOUNTER — HOSPITAL ENCOUNTER (OUTPATIENT)
Dept: MAMMOGRAPHY | Facility: CLINIC | Age: 45
Discharge: HOME | End: 2020-11-03
Payer: COMMERCIAL

## 2020-11-03 ENCOUNTER — HOSPITAL ENCOUNTER (OUTPATIENT)
Dept: ULTRASOUND IMAGING | Facility: CLINIC | Age: 45
Discharge: HOME | End: 2020-11-03
Payer: COMMERCIAL

## 2020-11-03 VITALS — TEMPERATURE: 97 F | WEIGHT: 122 LBS | BODY MASS INDEX: 20.83 KG/M2 | HEIGHT: 64 IN

## 2020-11-03 VITALS — TEMPERATURE: 97 F | HEIGHT: 64 IN | BODY MASS INDEX: 20.83 KG/M2 | WEIGHT: 122 LBS

## 2020-11-03 DIAGNOSIS — Z12.31 SCREENING MAMMOGRAM, ENCOUNTER FOR: ICD-10-CM

## 2020-11-03 DIAGNOSIS — R92.2 DENSE BREAST TISSUE: ICD-10-CM

## 2020-11-03 PROCEDURE — 76377 3D RENDER W/INTRP POSTPROCES: CPT

## 2020-11-03 PROCEDURE — 76641 ULTRASOUND BREAST COMPLETE: CPT

## 2020-11-03 PROCEDURE — 77067 SCR MAMMO BI INCL CAD: CPT

## 2020-11-03 PROCEDURE — 77063 BREAST TOMOSYNTHESIS BI: CPT

## 2020-12-15 ENCOUNTER — ANNUAL EXAM (OUTPATIENT)
Dept: OBGYN CLINIC | Facility: CLINIC | Age: 45
End: 2020-12-15
Payer: COMMERCIAL

## 2020-12-15 VITALS
HEIGHT: 64 IN | BODY MASS INDEX: 21.75 KG/M2 | DIASTOLIC BLOOD PRESSURE: 72 MMHG | SYSTOLIC BLOOD PRESSURE: 108 MMHG | WEIGHT: 127.4 LBS

## 2020-12-15 DIAGNOSIS — Z87.410 HISTORY OF CERVICAL DYSPLASIA: ICD-10-CM

## 2020-12-15 DIAGNOSIS — N92.0 MENORRHAGIA WITH REGULAR CYCLE: ICD-10-CM

## 2020-12-15 DIAGNOSIS — Z01.419 WOMEN'S ANNUAL ROUTINE GYNECOLOGICAL EXAMINATION: Primary | ICD-10-CM

## 2020-12-15 DIAGNOSIS — R92.2 DENSE BREAST TISSUE ON MAMMOGRAM: ICD-10-CM

## 2020-12-15 PROCEDURE — 87624 HPV HI-RISK TYP POOLED RSLT: CPT | Performed by: OBSTETRICS & GYNECOLOGY

## 2020-12-15 PROCEDURE — 1036F TOBACCO NON-USER: CPT | Performed by: OBSTETRICS & GYNECOLOGY

## 2020-12-15 PROCEDURE — G0145 SCR C/V CYTO,THINLAYER,RESCR: HCPCS | Performed by: OBSTETRICS & GYNECOLOGY

## 2020-12-15 PROCEDURE — 3008F BODY MASS INDEX DOCD: CPT | Performed by: OBSTETRICS & GYNECOLOGY

## 2020-12-15 PROCEDURE — 99396 PREV VISIT EST AGE 40-64: CPT | Performed by: OBSTETRICS & GYNECOLOGY

## 2020-12-15 RX ORDER — NORGESTIMATE AND ETHINYL ESTRADIOL
1 KIT DAILY
Qty: 84 TABLET | Refills: 3 | Status: SHIPPED | OUTPATIENT
Start: 2020-12-15 | End: 2021-12-21 | Stop reason: SDUPTHER

## 2020-12-18 LAB
HPV HR 12 DNA CVX QL NAA+PROBE: NEGATIVE
HPV16 DNA CVX QL NAA+PROBE: NEGATIVE
HPV18 DNA CVX QL NAA+PROBE: NEGATIVE
LAB AP GYN PRIMARY INTERPRETATION: NORMAL
LAB AP LMP: NORMAL
Lab: NORMAL

## 2021-04-19 ENCOUNTER — TELEMEDICINE (OUTPATIENT)
Dept: FAMILY MEDICINE CLINIC | Facility: CLINIC | Age: 46
End: 2021-04-19
Payer: COMMERCIAL

## 2021-04-19 DIAGNOSIS — R19.7 DIARRHEA, UNSPECIFIED TYPE: Primary | ICD-10-CM

## 2021-04-19 PROCEDURE — 99213 OFFICE O/P EST LOW 20 MIN: CPT | Performed by: FAMILY MEDICINE

## 2021-04-19 NOTE — PROGRESS NOTES
Virtual Regular Visit      Assessment/Plan:    Patient is being sent for COVID-19 testing  Will heed results  Patient to remain on home quarantine and isolation  Patient instructed to increase fluids in the form of ginger ale, Gatorade and then broth soups and chicken noodle and chicken rice soup  Recommend brat/bland diet  Discussed signs and symptoms of dehydration  If bleeding reoccurs patient to call the office and will recommend further evaluation  Follow-up in 24-48 hours or call sooner p r n     Problem List Items Addressed This Visit     None      Visit Diagnoses     Diarrhea, unspecified type    -  Primary               Reason for visit is No chief complaint on file  Encounter provider Eli Cevallos DO    Provider located at [address] [place] [address] [id number]      Recent Visits  No visits were found meeting these conditions  Showing recent visits within past 7 days and meeting all other requirements     Future Appointments  No visits were found meeting these conditions  Showing future appointments within next 150 days and meeting all other requirements        The patient was identified by name and date of birth  Jenna Gresham was informed that this is a telemedicine visit and that the visit is being conducted through [employer] App and patient was informed that this is not a secure, HIPAA-compliant platform  She agrees to proceed     My office door was closed  No one else was in the room  She acknowledged consent and understanding of privacy and security of the video platform  The patient has agreed to participate and understands they can discontinue the visit at any time  Patient is aware this is a billable service  Charissajayjay Tejada is a 39 y o  female  Started yesterday afternoon with stomach cramps and then developed watery diarrhea for 30 minutes  Abdominal cramps did improve after bowel movement    Patient then continued with episodes of watery diarrhea with bloody stools last night and 1st thing this morning at 5:00 a m  Rodney Wise Then patient had a normal formed bowel movement later this morning without any blood  She denies any melena  Patient did see blood on the toilet paper and in the bowl previously  She admits to slight nausea but no vomiting  She denies abdominal pain currently  Patient denies fever and temperature is 98 6  She does admit to chills and sweats and fatigue  Patient denies headache or body aches  She denies cough or shortness of breath  Patient denies loss of sense of smell or taste, rash or red eyes  She denies eating any unusual foods or undercooked foods  No recent travel or ill contacts or known COVID exposure  Patient has been working from home for over a year  She has treated this with crackers, drinking water and tea with phill with some improvement  HPI     Past Medical History:   Diagnosis Date    Abnormal Pap smear of cervix        Past Surgical History:   Procedure Laterality Date    WISDOM TOOTH EXTRACTION         Current Outpatient Medications   Medication Sig Dispense Refill    ASHWAGANDHA PO Take 450 mg by mouth 2 (two) times a day      CALCIUM PO Take by mouth      Cholecalciferol (VITAMIN D3) 10 MCG (400 UNIT) CAPS Take by mouth      Echinacea 400 MG CAPS Take 1 capsule by mouth 2 (two) times a day      Multiple Vitamin (MULTIVITAMIN) tablet Take 1 tablet by mouth daily      Omega-3 Fatty Acids (FISH OIL PO) Take by mouth      Tri-Lo-Lizette 0 18/0 215/0 25 MG-25 MCG per tablet Take 1 tablet by mouth daily 84 tablet 3    triamcinolone (KENALOG) 0 1 % cream Apply topically 2 (two) times a day (Patient not taking: Reported on 6/30/2020) 30 g 0     No current facility-administered medications for this visit  No Known Allergies    Review of Systems    Video Exam    There were no vitals filed for this visit      Physical Exam  Constitutional:       General: She is not in acute distress  Appearance: Normal appearance  She is not ill-appearing, toxic-appearing or diaphoretic  HENT:      Head: Normocephalic  Mouth/Throat:      Mouth: Mucous membranes are moist    Eyes:      General: No scleral icterus  Conjunctiva/sclera: Conjunctivae normal    Pulmonary:      Effort: Pulmonary effort is normal       Comments: Patient is talking in complete sentences without shortness of breath or cough  Neurological:      Mental Status: She is alert and oriented to person, place, and time  Psychiatric:         Mood and Affect: Mood normal          Behavior: Behavior normal          Thought Content: Thought content normal          Judgment: Judgment normal       Comments: Patient is smiling and cheerful throughout the exam           I spent 20 minutes directly with the patient during this visit      VIRTUAL VISIT [address] acknowledges that she has consented to an online visit or consultation  She understands that the online visit is based solely on information provided by her, and that, in the absence of a face-to-face physical evaluation by the physician, the diagnosis she receives is both limited and provisional in terms of accuracy and completeness  This is not intended to replace a full medical face-to-face evaluation by the physician  Vera Landeros understands and accepts these terms

## 2021-11-05 ENCOUNTER — HOSPITAL ENCOUNTER (OUTPATIENT)
Dept: RADIOLOGY | Facility: IMAGING CENTER | Age: 46
Discharge: HOME | End: 2021-11-05
Payer: COMMERCIAL

## 2021-11-05 VITALS — HEIGHT: 64 IN | WEIGHT: 127 LBS | BODY MASS INDEX: 21.68 KG/M2

## 2021-11-05 DIAGNOSIS — Z12.31 BREAST CANCER SCREENING BY MAMMOGRAM: ICD-10-CM

## 2021-11-05 PROCEDURE — 77063 BREAST TOMOSYNTHESIS BI: CPT

## 2021-11-05 PROCEDURE — 77067 SCR MAMMO BI INCL CAD: CPT

## 2021-12-21 ENCOUNTER — ANNUAL EXAM (OUTPATIENT)
Dept: OBGYN CLINIC | Facility: CLINIC | Age: 46
End: 2021-12-21
Payer: COMMERCIAL

## 2021-12-21 VITALS
WEIGHT: 129.4 LBS | SYSTOLIC BLOOD PRESSURE: 120 MMHG | DIASTOLIC BLOOD PRESSURE: 84 MMHG | BODY MASS INDEX: 22.09 KG/M2 | HEIGHT: 64 IN

## 2021-12-21 DIAGNOSIS — Z12.31 ENCOUNTER FOR SCREENING MAMMOGRAM FOR BREAST CANCER: Primary | ICD-10-CM

## 2021-12-21 DIAGNOSIS — N92.0 MENORRHAGIA WITH REGULAR CYCLE: ICD-10-CM

## 2021-12-21 DIAGNOSIS — Z30.41 ENCOUNTER FOR SURVEILLANCE OF CONTRACEPTIVE PILLS: ICD-10-CM

## 2021-12-21 PROCEDURE — 1036F TOBACCO NON-USER: CPT | Performed by: OBSTETRICS & GYNECOLOGY

## 2021-12-21 PROCEDURE — 3008F BODY MASS INDEX DOCD: CPT | Performed by: OBSTETRICS & GYNECOLOGY

## 2021-12-21 PROCEDURE — 99396 PREV VISIT EST AGE 40-64: CPT | Performed by: OBSTETRICS & GYNECOLOGY

## 2021-12-21 RX ORDER — NORGESTIMATE AND ETHINYL ESTRADIOL
1 KIT DAILY
Qty: 84 TABLET | Refills: 3 | Status: SHIPPED | OUTPATIENT
Start: 2021-12-21 | End: 2022-03-15

## 2022-12-01 ENCOUNTER — HOSPITAL ENCOUNTER (OUTPATIENT)
Dept: RADIOLOGY | Facility: IMAGING CENTER | Age: 47
End: 2022-12-01

## 2022-12-01 VITALS — BODY MASS INDEX: 23.05 KG/M2 | HEIGHT: 64 IN | WEIGHT: 135 LBS

## 2022-12-01 DIAGNOSIS — Z12.31 ENCOUNTER FOR SCREENING MAMMOGRAM FOR BREAST CANCER: ICD-10-CM

## 2023-01-17 ENCOUNTER — ANNUAL EXAM (OUTPATIENT)
Dept: GYNECOLOGY | Facility: CLINIC | Age: 48
End: 2023-01-17

## 2023-01-17 VITALS
HEIGHT: 64 IN | BODY MASS INDEX: 22.67 KG/M2 | SYSTOLIC BLOOD PRESSURE: 110 MMHG | DIASTOLIC BLOOD PRESSURE: 70 MMHG | WEIGHT: 132.8 LBS

## 2023-01-17 DIAGNOSIS — Z12.11 SCREENING FOR COLON CANCER: ICD-10-CM

## 2023-01-17 DIAGNOSIS — Z01.419 WOMEN'S ANNUAL ROUTINE GYNECOLOGICAL EXAMINATION: Primary | ICD-10-CM

## 2023-01-17 DIAGNOSIS — N92.0 MENORRHAGIA WITH REGULAR CYCLE: ICD-10-CM

## 2023-01-17 DIAGNOSIS — Z12.4 SCREENING FOR CERVICAL CANCER: ICD-10-CM

## 2023-01-17 DIAGNOSIS — Z12.31 ENCOUNTER FOR SCREENING MAMMOGRAM FOR BREAST CANCER: ICD-10-CM

## 2023-01-17 RX ORDER — NORGESTIMATE AND ETHINYL ESTRADIOL
1 KIT DAILY
Qty: 84 TABLET | Refills: 3 | Status: SHIPPED | OUTPATIENT
Start: 2023-01-17 | End: 2023-04-11

## 2023-01-17 NOTE — PROGRESS NOTES
Assessment     Annual well-woman exam    Contraceptive management    History of migraine headaches    History of cervical dysplasia        Plan     Screening mammogram ordered    Colon cancer screening, Cologuard ordered, brochure given    Renew OCPs    Follow-up 1 year p r n  Discussed diet exercise   All questions answered  Await pap smear results  Subjective  Here for annual exam     Martin Santiago is a 41 y o  female who presents for annual exam  Periods are regular every 28-30 days, lasting 4 days  Dysmenorrhea:none  Cyclic symptoms include none  No intermenstrual bleeding, spotting, or discharge  The patient reports that there is not domestic violence in her life  Current contraception: OCP (estrogen/progesterone)  History of abnormal Pap smear: yes -  History of cervical dysplasia  Family history of uterine or ovarian cancer: no  Regular self breast exam: yes  History of abnormal mammogram: no  Family history of breast cancer: no  History of abnormal lipids: no  Menstrual History:  OB History        2    Para   1    Term   1            AB        Living           SAB        IAB        Ectopic        Multiple        Live Births                    Menarche age: 17  Patient's last menstrual period was 2022 (exact date)  Review of Systems  Pertinent items are noted in HPI        Objective  No acute distress   /70 (BP Location: Right arm, Patient Position: Sitting, Cuff Size: Standard)   Ht 5' 4" (1 626 m)   Wt 60 2 kg (132 lb 12 8 oz)   LMP 2022 (Exact Date)   BMI 22 80 kg/m²     General:   alert and oriented, in no acute distress, alert, appears stated age and cooperative   Heart: regular rate and rhythm, S1, S2 normal, no murmur, click, rub or gallop   Lungs: clear to auscultation bilaterally   Abdomen: soft, non-tender, without masses or organomegaly   Vulva: normal, Bartholin's, Urethra, [place]'s normal, female escutcheon   Vagina: normal mucosa, normal discharge, no palpable nodules   Cervix: nabothian cyst and no bleeding following Pap   Uterus: anteverted, mobile, non-tender, normal shape and consistency   Adnexa: normal adnexa and no mass, fullness, tenderness   Bilateral breast exam in the sitting and supine position with chaperone present, no visible or palpable breast lesions identified  No breast masses noted  No supraclavicular or axillary lymphadenopathy noted  No nipple discharge  Reviewed self-breast exam techniques     Rectal exam,  deferred

## 2023-01-25 LAB
LAB AP GYN PRIMARY INTERPRETATION: NORMAL
LAB AP LMP: NORMAL
Lab: NORMAL

## 2023-02-12 LAB — COLOGUARD RESULT REPORTABLE: NEGATIVE

## 2023-04-27 ENCOUNTER — TELEPHONE (OUTPATIENT)
Dept: GYNECOLOGY | Facility: CLINIC | Age: 48
End: 2023-04-27

## 2023-04-27 DIAGNOSIS — N92.1 MENORRHAGIA WITH IRREGULAR CYCLE: Primary | ICD-10-CM

## 2023-04-27 DIAGNOSIS — N92.1 BREAKTHROUGH BLEEDING ON BIRTH CONTROL PILLS: ICD-10-CM

## 2023-04-27 NOTE — TELEPHONE ENCOUNTER
Patient wanted Dr Ky Obrien advice left on her voicemail  Left detailed message advising patient to continue BCP as directed, to got for blood work as ordered, all instructions given, and to follow up on 5/8/23 in [facility] office for [facility] and appointment with Dr Sammy Adler

## 2023-04-27 NOTE — TELEPHONE ENCOUNTER
Patient called c/o LMP 4/13/23 and still bleeding, light flow, 1 pad per day which is her normal   She had 1 heavy day on 4/15/23 but light since  No cramping, discharge, or foul odor  She is taking her BCP with compliance and did not miss any pills  She states this never happened before  Questions what to do  Her Yearly exam was on 1/17/23

## 2023-05-01 ENCOUNTER — OFFICE VISIT (OUTPATIENT)
Dept: FAMILY MEDICINE CLINIC | Facility: CLINIC | Age: 48
End: 2023-05-01

## 2023-05-01 VITALS
BODY MASS INDEX: 23.35 KG/M2 | HEIGHT: 64 IN | WEIGHT: 136.8 LBS | SYSTOLIC BLOOD PRESSURE: 114 MMHG | HEART RATE: 92 BPM | RESPIRATION RATE: 16 BRPM | OXYGEN SATURATION: 97 % | DIASTOLIC BLOOD PRESSURE: 72 MMHG | TEMPERATURE: 100 F

## 2023-05-01 DIAGNOSIS — J02.9 PHARYNGITIS, UNSPECIFIED ETIOLOGY: Primary | ICD-10-CM

## 2023-05-01 LAB — S PYO AG THROAT QL: NEGATIVE

## 2023-05-01 RX ORDER — AZITHROMYCIN 250 MG/1
TABLET, FILM COATED ORAL
Qty: 6 TABLET | Refills: 0 | Status: SHIPPED | OUTPATIENT
Start: 2023-05-01 | End: 2023-05-05

## 2023-05-01 NOTE — PROGRESS NOTES
Name: Dev Puga      : 1975      MRN: [id number]  Encounter Provider: Jessi Estrella DO  Encounter Date: 2023   Encounter department: [address] [place] [address]   Rapid strep test is negative  Patient be started on a Z-Geoff and may continue Tylenol or Motrin as needed  She may take Robitussin-DM as needed  Recommend increased fluids and rest   Return the office in 1 week or call sooner as needed  1  Pharyngitis, unspecified etiology  -     POCT rapid strepA  -     azithromycin (ZITHROMAX) 250 mg tablet; Take 2 tablets today then 1 tablet daily x 4 days           Subjective      Patient started 2 days ago with sore throat and painful swallowing  She admits to a low-grade fever  Patient admits to congestion and cough  She has treated this with Tylenol and Robitussin-DM without significant relief  Sore Throat   This is a new problem  The current episode started in the past 7 days  The problem has been gradually worsening  The maximum temperature recorded prior to her arrival was 100 4 - 100 9 F  Associated symptoms include congestion, coughing and a hoarse voice  Pertinent negatives include no ear pain, headaches, shortness of breath or trouble swallowing  She has had no exposure to strep or mono  She has tried acetaminophen (robitussin DM) for the symptoms  The treatment provided mild relief  Review of Systems   HENT: Positive for congestion, hoarse voice and sore throat  Negative for ear pain and trouble swallowing  Respiratory: Positive for cough  Negative for shortness of breath  Neurological: Negative for headaches         Current Outpatient Medications on File Prior to Visit   Medication Sig    ASHWAGANDHA PO Take 450 mg by mouth 2 (two) times a day    CALCIUM PO Take by mouth    Cholecalciferol (VITAMIN D3) 10 MCG (400 UNIT) CAPS Take by mouth    Echinacea 400 MG CAPS Take 1 capsule by mouth 2 (two) times a day    Multiple Vitamin "(MULTIVITAMIN) tablet Take 1 tablet by mouth daily    Omega-3 Fatty Acids (FISH OIL PO) Take by mouth    Tri-Lo-Lizette 0 18/0 215/0 25 MG-25 MCG per tablet Take 1 tablet by mouth daily    triamcinolone (KENALOG) 0 1 % cream Apply topically 2 (two) times a day (Patient not taking: Reported on 6/30/2020)       Objective     /72   Pulse 92   Temp 100 °F (37 8 °C) (Oral)   Resp 16   Ht 5' 4\" (1 626 m)   Wt 62 1 kg (136 lb 12 8 oz)   SpO2 97%   BMI 23 48 kg/m²     Physical Exam  Constitutional:       General: She is not in acute distress  Appearance: She is well-developed  She is ill-appearing  She is not toxic-appearing or diaphoretic  HENT:      Head: Normocephalic  Right Ear: Tympanic membrane normal       Left Ear: Tympanic membrane normal       Nose: No congestion  Mouth/Throat:      Mouth: Mucous membranes are moist       Pharynx: Posterior oropharyngeal erythema present  No oropharyngeal exudate  Eyes:      Conjunctiva/sclera: Conjunctivae normal    Cardiovascular:      Rate and Rhythm: Normal rate and regular rhythm  Pulmonary:      Effort: Pulmonary effort is normal       Breath sounds: Normal breath sounds  Abdominal:      General: Bowel sounds are normal       Palpations: Abdomen is soft  Musculoskeletal:      Cervical back: Neck supple  Lymphadenopathy:      Cervical: No cervical adenopathy  Skin:     General: Skin is warm  Neurological:      General: No focal deficit present  Mental Status: She is alert     Psychiatric:         Mood and Affect: Mood normal          Behavior: Behavior normal        Dipti Goodcarolina, DO  "

## 2023-05-08 ENCOUNTER — OFFICE VISIT (OUTPATIENT)
Dept: GYNECOLOGY | Facility: CLINIC | Age: 48
End: 2023-05-08

## 2023-05-08 ENCOUNTER — ULTRASOUND (OUTPATIENT)
Dept: GYNECOLOGY | Facility: CLINIC | Age: 48
End: 2023-05-08

## 2023-05-08 VITALS
WEIGHT: 137.6 LBS | DIASTOLIC BLOOD PRESSURE: 78 MMHG | HEIGHT: 63 IN | SYSTOLIC BLOOD PRESSURE: 122 MMHG | BODY MASS INDEX: 24.38 KG/M2

## 2023-05-08 DIAGNOSIS — D21.9 FIBROIDS: Primary | ICD-10-CM

## 2023-05-08 DIAGNOSIS — Z87.410 HISTORY OF CERVICAL DYSPLASIA: ICD-10-CM

## 2023-05-08 DIAGNOSIS — Z30.41 ENCOUNTER FOR SURVEILLANCE OF CONTRACEPTIVE PILLS: ICD-10-CM

## 2023-05-08 DIAGNOSIS — Z86.69 HISTORY OF MIGRAINE HEADACHES: ICD-10-CM

## 2023-05-08 DIAGNOSIS — N92.1 MENORRHAGIA WITH IRREGULAR CYCLE: Primary | ICD-10-CM

## 2023-05-08 DIAGNOSIS — D25.1 FIBROIDS, INTRAMURAL: ICD-10-CM

## 2023-05-08 NOTE — PROGRESS NOTES
AMB US Pelvic Non OB    Date/Time: 5/8/2023 9:40 AM  Performed by: Zuleima Whittington  Authorized by: Susan Dixon DO   Universal Protocol:  Consent given by: patient  Patient identity confirmed: verbally with patient      Procedure details:     Indications: non-obstetric vaginal bleeding      Technique:  Transvaginal US, Non-OB    Position: lithotomy exam    Uterine findings:     Length (cm): 7 93    Height (cm):  3 62    Width (cm):  6 25    Endometrial stripe: identified      Endometrium thickness (mm):  5 5  Left ovary findings:     Left ovary:  Visualized    Length (cm): 1 88    Height (cm): 1 21    Width (cm): 1 41  Right ovary findings:     Right ovary:  Visualized    Length (cm): 2 1    Height (cm): 0 73    Width (cm): 1 14  Other findings:     Free pelvic fluid: not identified      Free peritoneal fluid: not identified    Post-Procedure Details:     Impression:  Anteverted uterus demonstrates multiple fibroids  Largest are right anterior intramural with possible submucosal extension 1 5cm, left anterior intramural 9 3mm, and posterior intramural 7 3mm  There are multiple nabothian cysts noted in the cervix  Bilateral ovaries appear within normal limits  No free fluid  Tolerance: Tolerated well, no immediate complications    Complications: no complications    Additional Procedure Comments:      [employer] F8 E8C-RS transvaginal transducer Serial # [id number] was used to perform the examination today and subsequently followed with high level disinfection utilizing Trophon EPR procedure  Ultrasound performed at:     [address] [address]  Phone:  [phone]  Fax:  [phone]

## 2023-05-08 NOTE — PROGRESS NOTES
"Assessment/Plan:    Diagnoses and all orders for this visit:    Menorrhagia with irregular cycle    Fibroids, intramural    History of migraine headaches    History of cervical dysplasia    Encounter for surveillance of contraceptive pills        Subjective: Menorrhagia     Patient ID: Duy Leo is a 41 y o  female  HPI   73-year-old female  1 para 1 recently here in January for her annual exam   She is on oral contraceptives mainly to regulate her menstrual cycle  She is not currently sexually active  She has been on birth control for many years  She also is a history of migraine headaches  Her most recent menstrual cycle lasted for 14 to 15 days  She had severe significant menstrual migraine during that time as well she could not eat and she was vomiting,\" worst she is ever had\"  We did a pelvic ultrasound today which revealed multiple uterine fibroids  Pelvic ultrasound anteverted uterus demonstrating multiple fibroids  Largest are right anterior intramural with possible submucosal extension at 1-1/2 cm  Left anterior intramural only 9 3 mm and posterior intramural 7 3 mm  Endometrial thickness is bilateral ovaries appear to be within normal limits there is no free fluid noted  Recommend she return in several weeks for endometrial biopsy  Risks and benefits reviewed  Discussed the possibility of endometrial hyperplasia  Hopefully this is a one-time event however we have to rule out any other uterine pathology  Review of Systems  Unchanged from previous visit    Objective: No acute distress  /78 (BP Location: Left arm, Patient Position: Sitting, Cuff Size: Standard)   Ht 5' 3\" (1 6 m)   Wt 62 4 kg (137 lb 9 6 oz)   LMP 2023 (Exact Date)   BMI 24 37 kg/m²      Physical Exam  Vitals reviewed  Constitutional:       Appearance: Normal appearance  She is normal weight  HENT:      Head: Normocephalic  Eyes:      Extraocular Movements: Extraocular movements intact      " Pupils: Pupils are equal, round, and reactive to light  Pulmonary:      Effort: Pulmonary effort is normal    Genitourinary:     Comments: Pelvic exam deferred  Musculoskeletal:         General: Normal range of motion  Cervical back: Normal range of motion  Neurological:      Mental Status: She is alert and oriented to person, place, and time  Psychiatric:         Mood and Affect: Mood normal          Behavior: Behavior normal          Thought Content: Thought content normal          Judgment: Judgment normal         Please note    In addition to the time spent discussing the findings and results of today's visit and exam, I spent approximately 20 minutes of face-to-face time with the patient, greater than 50% of which was spent in counseling and coordination of care for this patient

## 2023-05-24 ENCOUNTER — PROCEDURE VISIT (OUTPATIENT)
Dept: GYNECOLOGY | Facility: CLINIC | Age: 48
End: 2023-05-24

## 2023-05-24 VITALS
BODY MASS INDEX: 24.1 KG/M2 | DIASTOLIC BLOOD PRESSURE: 70 MMHG | SYSTOLIC BLOOD PRESSURE: 120 MMHG | WEIGHT: 136 LBS | HEIGHT: 63 IN

## 2023-05-24 DIAGNOSIS — Z30.41 ENCOUNTER FOR SURVEILLANCE OF CONTRACEPTIVE PILLS: ICD-10-CM

## 2023-05-24 DIAGNOSIS — N92.0 MENORRHAGIA WITH REGULAR CYCLE: Primary | ICD-10-CM

## 2023-05-24 DIAGNOSIS — R93.89 THICKENED ENDOMETRIUM: ICD-10-CM

## 2023-05-24 NOTE — PROGRESS NOTES
Endometrial biopsy    Date/Time: 5/24/2023 9:20 AM    Performed by: Nasreen Vázquez DO  Authorized by: Nasreen Vázquez DO  Universal Protocol:  Consent: Written consent obtained  Risks and benefits: risks, benefits and alternatives were discussed  Consent given by: patient  Patient understanding: patient states understanding of the procedure being performed  Patient consent: the patient's understanding of the procedure matches consent given  Procedure consent: procedure consent matches procedure scheduled  Patient identity confirmed: verbally with patient      Indication:     Indications: Other disorder of menstruation and other abnormal bleeding from female genital tract    Pre-procedure:     Premeds:  Ibuprofen  Procedure:     Procedure: endometrial biopsy with Pipelle      A bivalve speculum was placed in the vagina: yes      Cervix cleaned and prepped: yes      A paracervical block was performed: no      An intracervical block was performed: no      The cervix was dilated: no      Uterus sounded: yes      Uterus sound depth (cm):  7    Curettes used:  1    Specimen collected: specimen collected and sent to pathology    Findings:     Uterus size:  6-8 weeks    Cervix: normal      Adnexa: normal    Comments:     Procedure comments:  Patient tolerated EMB without difficulty will return the upcoming weeks to discuss follow-up plans to possibly schedule surgery

## 2023-05-24 NOTE — PROGRESS NOTES
Assessment/Plan:    Diagnoses and all orders for this visit:    Menorrhagia with regular cycle  -     Tissue Exam    Thickened endometrium  -     Tissue Exam    Encounter for surveillance of contraceptive pills        Subjective: Here for endometrial biopsy and plan of care     Patient ID: Vic Velasco is a 41 y o  female  HPI   44-year-old female  1 para 1 recently here in January of this past year for annual exam   She is on oral contraceptives mainly to regulate her menstrual cycle  She is not currently sexually active and she has been on birth control pills for many years  She also has a history of migraine headaches which can be severe at times  Last month she had a menstrual cycle that lasted for 14 to 15 days  She also suffered significant menstrual migraine at the same time, where she never had  Pelvic ultrasound done at that visit revealed uterine fibroids  Largest are right anterior intramural with possible submucosal extension at 1-1/2 cm  Left anterior intramural only 9 3 mm and posterior intramural 7 3 mm  Endometrial thickness was noted to be normal at 5 5 mm  I recommended endometrial biopsy that is why she is here today  Biopsy dictated separately  We did again review the ultrasound findings and suggested plan of care  She may have ongoing irregular menses which may be heavy at times because of the submucosal extension of one of her fibroids  I recommended she consider Olimpia endometrial ablation and bilateral salpingectomy for permanent sterilization  Risks and benefits briefly reviewed with the patient  Brochure given to patient  She will return in the upcoming weeks to discuss biopsy findings and ongoing plan of care  All questions answered  She is concerned about weight gain which may be aggravated by OCPs  She does try to eat a healthy diet and exercise she recently bought a treadmill      Review of Systems  Unchanged from previous visit    Objective: No acute "distress  /70   Ht 5' 3\" (1 6 m)   Wt 61 7 kg (136 lb)   LMP 05/15/2023 (Exact Date)   BMI 24 09 kg/m²      Physical Exam  Vitals and nursing note reviewed  Exam conducted with a chaperone present  Constitutional:       Appearance: Normal appearance  She is normal weight  HENT:      Head: Normocephalic  Eyes:      Pupils: Pupils are equal, round, and reactive to light  Pulmonary:      Effort: Pulmonary effort is normal    Abdominal:      General: Abdomen is flat  Palpations: Abdomen is soft  Genitourinary:     General: Normal vulva  Musculoskeletal:         General: Normal range of motion  Skin:     General: Skin is warm and dry  Neurological:      Mental Status: She is alert and oriented to person, place, and time  Psychiatric:         Mood and Affect: Mood normal          Behavior: Behavior normal          Thought Content: Thought content normal          Judgment: Judgment normal         Please note    In addition to the time spent discussing the findings and results of today's visit and exam, I spent approximately 30 minutes of face-to-face time with the patient, greater than 50% of which was spent in counseling and coordination of care for this patient    "

## 2023-06-13 ENCOUNTER — OFFICE VISIT (OUTPATIENT)
Dept: GYNECOLOGY | Facility: CLINIC | Age: 48
End: 2023-06-13
Payer: COMMERCIAL

## 2023-06-13 VITALS
BODY MASS INDEX: 23.88 KG/M2 | SYSTOLIC BLOOD PRESSURE: 114 MMHG | DIASTOLIC BLOOD PRESSURE: 72 MMHG | HEIGHT: 63 IN | WEIGHT: 134.8 LBS

## 2023-06-13 DIAGNOSIS — D25.1 FIBROIDS, INTRAMURAL: ICD-10-CM

## 2023-06-13 DIAGNOSIS — N92.1 MENORRHAGIA WITH IRREGULAR CYCLE: ICD-10-CM

## 2023-06-13 DIAGNOSIS — Z30.41 ENCOUNTER FOR SURVEILLANCE OF CONTRACEPTIVE PILLS: ICD-10-CM

## 2023-06-13 DIAGNOSIS — D25.0 FIBROIDS, SUBMUCOSAL: ICD-10-CM

## 2023-06-13 PROCEDURE — 99213 OFFICE O/P EST LOW 20 MIN: CPT | Performed by: OBSTETRICS & GYNECOLOGY

## 2023-06-13 NOTE — PROGRESS NOTES
"Assessment/Plan:    Diagnoses and all orders for this visit:    Menorrhagia with irregular cycle    Fibroids, submucosal    Fibroids, intramural    Encounter for surveillance of contraceptive pills        Subjective: Here for test results and plan of care     Patient ID: Dat Reyes is a 41 y o  female  HPI   24-year-old female  1 para 1 recently here in January for annual exam   She currently is on oral contraceptives mainly to regulate her menstrual cycle  She is not currently sexually active and she has been on birth control for many years  She has a history of migraine headaches  Recently she had a menstrual cycle that lasted between 14 and 15 days  She has significant menstrual migraine during that time as well as she could not eat and she was vomiting, the worst ever had  Pelvic ultrasound revealed a normal-sized uterus with multiple fibroids both intramural and submucosal fibroids  Endometrial thickness was normal there was no free fluid noted  Because of this change in her menstrual cycles I recommended she have endometrial biopsy  Endometrial biopsy revealed benign proliferative phase endometrium with prominent features of benign polyp, negative for atypia or carcinoma  Discussed  options moving forward she is given information on the Olimpia endometrial ablation  She would also be interested in having bilateral salpingectomy for permanent sterilization  We briefly reviewed the risks and benefits of these procedures  She currently works from home, coparenting with her estranged   She will consider her options and schedule in the upcoming weeks to schedule her surgery and sign consents and preoperative exam   All questions answered      Review of Systems  Unchanged    Objective: No acute distress  /72 (BP Location: Left arm, Patient Position: Sitting, Cuff Size: Standard)   Ht 5' 3\" (1 6 m)   Wt 61 1 kg (134 lb 12 8 oz)   LMP 05/15/2023 (Exact Date)   BMI 23 88 " kg/m²      Physical Exam  Vitals and nursing note reviewed  Constitutional:       Appearance: Normal appearance  She is normal weight  HENT:      Head: Normocephalic  Eyes:      Pupils: Pupils are equal, round, and reactive to light  Pulmonary:      Effort: Pulmonary effort is normal    Genitourinary:     Comments: Pelvic exam deferred  Musculoskeletal:         General: Normal range of motion  Cervical back: Normal range of motion  Skin:     General: Skin is warm and dry  Neurological:      Mental Status: She is alert and oriented to person, place, and time  Psychiatric:         Mood and Affect: Mood normal          Behavior: Behavior normal          Thought Content: Thought content normal          Judgment: Judgment normal         Please note    In addition to the time spent discussing the findings and results of today's visit and exam, I spent approximately 20 minutes of face-to-face time with the patient, greater than 50% of which was spent in counseling and coordination of care for this patient

## 2023-07-14 ENCOUNTER — TELEPHONE (OUTPATIENT)
Dept: GYNECOLOGY | Facility: CLINIC | Age: 48
End: 2023-07-14

## 2023-07-14 NOTE — TELEPHONE ENCOUNTER
I spoke to [address]  at [place] on 7/14/2023 at 3:25 pm no prior authorization is needed for CPT code [id number] being performed on 8/9/2023.

## 2023-07-22 ENCOUNTER — LAB REQUISITION (OUTPATIENT)
Dept: LAB | Facility: HOSPITAL | Age: 48
End: 2023-07-22
Payer: COMMERCIAL

## 2023-07-22 ENCOUNTER — APPOINTMENT (OUTPATIENT)
Dept: LAB | Facility: CLINIC | Age: 48
End: 2023-07-22
Payer: COMMERCIAL

## 2023-07-22 DIAGNOSIS — Z01.818 PRE-OP TESTING: ICD-10-CM

## 2023-07-22 DIAGNOSIS — Z01.818 ENCOUNTER FOR OTHER PREPROCEDURAL EXAMINATION: ICD-10-CM

## 2023-07-22 LAB
ABO GROUP BLD: NORMAL
BLD GP AB SCN SERPL QL: NEGATIVE
ERYTHROCYTE [DISTWIDTH] IN BLOOD BY AUTOMATED COUNT: 12.5 % (ref 11.6–15.1)
EST. AVERAGE GLUCOSE BLD GHB EST-MCNC: 88 MG/DL
HBA1C MFR BLD: 4.7 %
HCT VFR BLD AUTO: 40.1 % (ref 34.8–46.1)
HGB BLD-MCNC: 12.8 G/DL (ref 11.5–15.4)
MCH RBC QN AUTO: 29.4 PG (ref 26.8–34.3)
MCHC RBC AUTO-ENTMCNC: 31.9 G/DL (ref 31.4–37.4)
MCV RBC AUTO: 92 FL (ref 82–98)
PLATELET # BLD AUTO: 287 THOUSANDS/UL (ref 149–390)
PMV BLD AUTO: 11.5 FL (ref 8.9–12.7)
RBC # BLD AUTO: 4.35 MILLION/UL (ref 3.81–5.12)
RH BLD: POSITIVE
SPECIMEN EXPIRATION DATE: NORMAL
WBC # BLD AUTO: 8.44 THOUSAND/UL (ref 4.31–10.16)

## 2023-07-22 PROCEDURE — 86901 BLOOD TYPING SEROLOGIC RH(D): CPT | Performed by: OBSTETRICS & GYNECOLOGY

## 2023-07-22 PROCEDURE — 85027 COMPLETE CBC AUTOMATED: CPT

## 2023-07-22 PROCEDURE — 83036 HEMOGLOBIN GLYCOSYLATED A1C: CPT

## 2023-07-22 PROCEDURE — 86850 RBC ANTIBODY SCREEN: CPT | Performed by: OBSTETRICS & GYNECOLOGY

## 2023-07-22 PROCEDURE — 86900 BLOOD TYPING SEROLOGIC ABO: CPT | Performed by: OBSTETRICS & GYNECOLOGY

## 2023-07-26 ENCOUNTER — OFFICE VISIT (OUTPATIENT)
Dept: GYNECOLOGY | Facility: CLINIC | Age: 48
End: 2023-07-26

## 2023-07-26 VITALS
BODY MASS INDEX: 24.17 KG/M2 | HEIGHT: 63 IN | DIASTOLIC BLOOD PRESSURE: 82 MMHG | WEIGHT: 136.4 LBS | SYSTOLIC BLOOD PRESSURE: 126 MMHG

## 2023-07-26 DIAGNOSIS — D25.1 FIBROIDS, INTRAMURAL: ICD-10-CM

## 2023-07-26 DIAGNOSIS — Z86.69 HISTORY OF MIGRAINE HEADACHES: ICD-10-CM

## 2023-07-26 DIAGNOSIS — Z30.2 REQUEST FOR STERILIZATION: ICD-10-CM

## 2023-07-26 DIAGNOSIS — Z01.818 PREOPERATIVE EXAM FOR GYNECOLOGIC SURGERY: Primary | ICD-10-CM

## 2023-07-26 DIAGNOSIS — D25.0 FIBROIDS, SUBMUCOSAL: ICD-10-CM

## 2023-07-26 DIAGNOSIS — N92.1 MENORRHAGIA WITH IRREGULAR CYCLE: ICD-10-CM

## 2023-07-26 NOTE — H&P
Assessment/Plan:   Diagnoses and all orders for this visit:    Preoperative exam for gynecologic surgery    Menorrhagia with irregular cycle    Request for sterilization    Fibroids, submucosal    Fibroids, intramural    History of migraine headaches       Subjective: Here for preop physical exam    Surgery planned Dagnostic laparoscopy with bilateral salpingectomy                                 History with Olimpia endometrial ablation    Patient ID: Tracy Mccauley is a 41 y.o. female. HPI   63-year-old female  1 para 1 here in January for annual exam.  She is currently on OCPs to regulate her menstrual cycle. She is not currently sexually active and has been on birth control for many years. She does have a history of menstrual type migraine headaches. States her menstrual cycles recently have become longer lasting up to 14 to 15 days. Also again associated with migraine headaches. Pelvic ultrasound normal size uterus measuring 7.9 x 3.62 x 6.25 cm. Normal endometrial thickness at 5.5 mm  Anteverted uterus demonstrates multiple fibroids the largest right anterior intramural with possible submucosal extension at 1.5 cm. There is also left anterior intramural fibroid measuring only 9.3 mm in a posterior intramural omentum with  Multiple nabothian cysts, both ovaries appear to be within normal limits there is no free fluid noted. Endometrial biopsy benign proliferative endometrium with prominent features of benign polyp, negative for atypia or carcinoma. Patient request D&C hysteroscopy with removal.  Also recommend tubal sterilization at that same time so she can stop her OCPs. Procedure risks and benefits reviewed in detail operative consent signed today all questions answered. She is scheduled for 2023 all questions answered.     Review of Systems Unchanged    Objective: No acute distress  /82 (BP Location: Left arm, Patient Position: Sitting, Cuff Size: Standard)   Ht 5' 3" (1.6 m)   Wt 61.9 kg (136 lb 6.4 oz)   BMI 24.16 kg/m²     Physical Exam  Vitals reviewed. Constitutional:       Appearance: Normal appearance. She is normal weight. Eyes:      Pupils: Pupils are equal, round, and reactive to light. Pulmonary:      Effort: Pulmonary effort is normal.   Abdominal:      General: Abdomen is flat. Palpations: Abdomen is soft. Genitourinary:     General: Normal vulva. Comments: Normal external genitalia  Normal size uterus  Normal cervix  No CMT  No pelvic masses  Normal vaginal discharge  Musculoskeletal:         General: Normal range of motion. Cervical back: Normal range of motion. Skin:     General: Skin is warm and dry. Neurological:      Mental Status: She is alert and oriented to person, place, and time. Psychiatric:         Mood and Affect: Mood normal.         Behavior: Behavior normal.         Thought Content: Thought content normal.         Judgment: Judgment normal.       Please note    In addition to the time spent discussing the findings and results of today's visit and exam, I spent approximately 30 minutes of face-to-face time with the patient, greater than 50% of which was spent in counseling and coordination of care for this patient.

## 2023-07-26 NOTE — PROGRESS NOTES
Assessment/Plan:    Diagnoses and all orders for this visit:    Preoperative exam for gynecologic surgery    Menorrhagia with irregular cycle    Request for sterilization    Fibroids, submucosal    Fibroids, intramural    History of migraine headaches        Subjective: Here for preop physical exam    Surgery planned Dagnostic laparoscopy with bilateral salpingectomy                                 History with Olimpia endometrial ablation     Patient ID: Sosa Cole is a 41 y.o. female. HPI   51-year-old female  1 para 1 here in January for annual exam.  She is currently on OCPs to regulate her menstrual cycle. She is not currently sexually active and has been on birth control for many years. She does have a history of menstrual type migraine headaches. States her menstrual cycles recently have become longer lasting up to 14 to 15 days. Also again associated with migraine headaches. Pelvic ultrasound normal size uterus measuring 7.9 x 3.62 x 6.25 cm. Normal endometrial thickness at 5.5 mm  Anteverted uterus demonstrates multiple fibroids the largest right anterior intramural with possible submucosal extension at 1.5 cm. There is also left anterior intramural fibroid measuring only 9.3 mm in a posterior intramural omentum with  Multiple nabothian cysts, both ovaries appear to be within normal limits there is no free fluid noted. Endometrial biopsy benign proliferative endometrium with prominent features of benign polyp, negative for atypia or carcinoma. Patient request D&C hysteroscopy with removal.  Also recommend tubal sterilization at that same time so she can stop her OCPs. Procedure risks and benefits reviewed in detail operative consent signed today all questions answered. She is scheduled for 2023 all questions answered.     Review of Systems  Unchanged    Objective: No acute distress  /82 (BP Location: Left arm, Patient Position: Sitting, Cuff Size: Standard)   Ht 5' 3" (1.6 m)   Wt 61.9 kg (136 lb 6.4 oz)   BMI 24.16 kg/m²      Physical Exam  Vitals reviewed. Constitutional:       Appearance: Normal appearance. She is normal weight. Eyes:      Pupils: Pupils are equal, round, and reactive to light. Pulmonary:      Effort: Pulmonary effort is normal.   Abdominal:      General: Abdomen is flat. Palpations: Abdomen is soft. Genitourinary:     General: Normal vulva. Comments: Normal external genitalia  Normal size uterus  Normal cervix  No CMT  No pelvic masses  Normal vaginal discharge  Musculoskeletal:         General: Normal range of motion. Cervical back: Normal range of motion. Skin:     General: Skin is warm and dry. Neurological:      Mental Status: She is alert and oriented to person, place, and time. Psychiatric:         Mood and Affect: Mood normal.         Behavior: Behavior normal.         Thought Content: Thought content normal.         Judgment: Judgment normal.        Please note    In addition to the time spent discussing the findings and results of today's visit and exam, I spent approximately 30 minutes of face-to-face time with the patient, greater than 50% of which was spent in counseling and coordination of care for this patient.

## 2023-07-26 NOTE — H&P (VIEW-ONLY)
Assessment/Plan:   Diagnoses and all orders for this visit:    Preoperative exam for gynecologic surgery    Menorrhagia with irregular cycle    Request for sterilization    Fibroids, submucosal    Fibroids, intramural    History of migraine headaches       Subjective: Here for preop physical exam    Surgery planned Dagnostic laparoscopy with bilateral salpingectomy                                 History with Olimpia endometrial ablation    Patient ID: Geremias Thao is a 41 y.o. female. HPI   79-year-old female  1 para 1 here in January for annual exam.  She is currently on OCPs to regulate her menstrual cycle. She is not currently sexually active and has been on birth control for many years. She does have a history of menstrual type migraine headaches. States her menstrual cycles recently have become longer lasting up to 14 to 15 days. Also again associated with migraine headaches. Pelvic ultrasound normal size uterus measuring 7.9 x 3.62 x 6.25 cm. Normal endometrial thickness at 5.5 mm  Anteverted uterus demonstrates multiple fibroids the largest right anterior intramural with possible submucosal extension at 1.5 cm. There is also left anterior intramural fibroid measuring only 9.3 mm in a posterior intramural omentum with  Multiple nabothian cysts, both ovaries appear to be within normal limits there is no free fluid noted. Endometrial biopsy benign proliferative endometrium with prominent features of benign polyp, negative for atypia or carcinoma. Patient request D&C hysteroscopy with removal.  Also recommend tubal sterilization at that same time so she can stop her OCPs. Procedure risks and benefits reviewed in detail operative consent signed today all questions answered. She is scheduled for 2023 all questions answered.     Review of Systems Unchanged    Objective: No acute distress  /82 (BP Location: Left arm, Patient Position: Sitting, Cuff Size: Standard)   Ht 5' 3" (1.6 m)   Wt 61.9 kg (136 lb 6.4 oz)   BMI 24.16 kg/m²     Physical Exam  Vitals reviewed. Constitutional:       Appearance: Normal appearance. She is normal weight. Eyes:      Pupils: Pupils are equal, round, and reactive to light. Pulmonary:      Effort: Pulmonary effort is normal.   Abdominal:      General: Abdomen is flat. Palpations: Abdomen is soft. Genitourinary:     General: Normal vulva. Comments: Normal external genitalia  Normal size uterus  Normal cervix  No CMT  No pelvic masses  Normal vaginal discharge  Musculoskeletal:         General: Normal range of motion. Cervical back: Normal range of motion. Skin:     General: Skin is warm and dry. Neurological:      Mental Status: She is alert and oriented to person, place, and time. Psychiatric:         Mood and Affect: Mood normal.         Behavior: Behavior normal.         Thought Content: Thought content normal.         Judgment: Judgment normal.       Please note    In addition to the time spent discussing the findings and results of today's visit and exam, I spent approximately 30 minutes of face-to-face time with the patient, greater than 50% of which was spent in counseling and coordination of care for this patient.

## 2023-08-04 ENCOUNTER — ANESTHESIA EVENT (OUTPATIENT)
Dept: PERIOP | Facility: HOSPITAL | Age: 48
End: 2023-08-04
Payer: COMMERCIAL

## 2023-08-04 RX ORDER — IBUPROFEN 200 MG
200 TABLET ORAL ONCE
COMMUNITY

## 2023-08-04 NOTE — PRE-PROCEDURE INSTRUCTIONS
Pre-Surgery Instructions:   Medication Instructions   • ASHWAGANDHA PO Stop taking 7 days prior to surgery. • CALCIUM PO Stop taking 7 days prior to surgery. • Cholecalciferol (VITAMIN D3) 10 MCG (400 UNIT) CAPS Stop taking 7 days prior to surgery. • Echinacea 400 MG CAPS Stop taking 7 days prior to surgery. • ibuprofen (MOTRIN) 200 mg tablet Stop taking 5 days prior to surgery. • Multiple Vitamin (MULTIVITAMIN) tablet Stop taking 7 days prior to surgery. • Omega-3 Fatty Acids (FISH OIL PO) Stop taking 7 days prior to surgery. • Probiotic Product (PROBIOTIC PO) Stop taking 7 days prior to surgery. • Tri-Lo-Lizette 0.18/0.215/0.25 MG-25 MCG per tablet Instructions provided by MD   • VITAMIN E PO Stop taking 7 days prior to surgery. Medication instructions for day surgery reviewed. Please use only a sip of water to take your instructed medications. Avoid all over the counter vitamins, supplements and NSAIDS for one week prior to surgery per anesthesia guidelines. Tylenol is ok to take as needed. You will receive a call one business day prior to surgery with an arrival time and hospital directions. If your surgery is scheduled on a Monday, the hospital will be calling you on the Friday prior to your surgery. If you have not heard from anyone by 8pm, please call the hospital supervisor through the hospital  at [phone]. Monico Fan [phone]). Do not eat or drink anything after midnight the night before your surgery, including candy, mints, lifesavers, or chewing gum. Do not drink alcohol 24hrs before your surgery. Try not to smoke at least 24hrs before your surgery. Follow the pre surgery showering instructions as listed in the [facility] Surgical Experience Booklet” or otherwise provided by your surgeon's office. Do not shave the surgical area 24 hours before surgery.  Do not apply any lotions, creams, including makeup, cologne, deodorant, or perfumes after showering on the day of your surgery. No contact lenses, eye make-up, or artificial eyelashes. Remove nail polish, including gel polish, and any artificial, gel, or acrylic nails if possible. Remove all jewelry including rings and body piercing jewelry. Wear causal clothing that is easy to take on and off. Consider your type of surgery. Keep any valuables, jewelry, piercings at home. Please bring any specially ordered equipment (sling, braces) if indicated. Arrange for a responsible person to drive you to and from the hospital on the day of your surgery. Visitor Guidelines discussed. Call the surgeon's office with any new illnesses, exposures, or additional questions prior to surgery. Please reference your [facility] Surgical Experience Booklet” for additional information to prepare for your upcoming surgery.

## 2023-08-09 ENCOUNTER — ANESTHESIA (OUTPATIENT)
Dept: PERIOP | Facility: HOSPITAL | Age: 48
End: 2023-08-09
Payer: COMMERCIAL

## 2023-08-09 ENCOUNTER — HOSPITAL ENCOUNTER (OUTPATIENT)
Facility: HOSPITAL | Age: 48
Setting detail: OUTPATIENT SURGERY
Discharge: HOME | End: 2023-08-09
Attending: OBSTETRICS & GYNECOLOGY | Admitting: OBSTETRICS & GYNECOLOGY
Payer: COMMERCIAL

## 2023-08-09 VITALS
HEIGHT: 63 IN | OXYGEN SATURATION: 99 % | BODY MASS INDEX: 23.79 KG/M2 | SYSTOLIC BLOOD PRESSURE: 124 MMHG | HEART RATE: 63 BPM | WEIGHT: 134.26 LBS | RESPIRATION RATE: 16 BRPM | DIASTOLIC BLOOD PRESSURE: 68 MMHG | TEMPERATURE: 98 F

## 2023-08-09 DIAGNOSIS — G89.18 POSTOPERATIVE PAIN: Primary | ICD-10-CM

## 2023-08-09 DIAGNOSIS — Z30.2 ENCOUNTER FOR STERILIZATION: ICD-10-CM

## 2023-08-09 DIAGNOSIS — N92.1 MENORRHAGIA WITH IRREGULAR CYCLE: ICD-10-CM

## 2023-08-09 LAB
EXT PREGNANCY TEST URINE: NEGATIVE
EXT. CONTROL: NORMAL

## 2023-08-09 PROCEDURE — 88305 TISSUE EXAM BY PATHOLOGIST: CPT | Performed by: PATHOLOGY

## 2023-08-09 PROCEDURE — 88342 IMHCHEM/IMCYTCHM 1ST ANTB: CPT | Performed by: PATHOLOGY

## 2023-08-09 PROCEDURE — 88302 TISSUE EXAM BY PATHOLOGIST: CPT | Performed by: PATHOLOGY

## 2023-08-09 PROCEDURE — 88344 IMHCHEM/IMCYTCHM EA MLT ANTB: CPT | Performed by: PATHOLOGY

## 2023-08-09 PROCEDURE — 81025 URINE PREGNANCY TEST: CPT | Performed by: ANESTHESIOLOGY

## 2023-08-09 RX ORDER — FENTANYL CITRATE 50 UG/ML
INJECTION, SOLUTION INTRAMUSCULAR; INTRAVENOUS AS NEEDED
Status: DISCONTINUED | OUTPATIENT
Start: 2023-08-09 | End: 2023-08-09

## 2023-08-09 RX ORDER — SODIUM CHLORIDE 9 MG/ML
125 INJECTION, SOLUTION INTRAVENOUS CONTINUOUS
Status: DISCONTINUED | OUTPATIENT
Start: 2023-08-09 | End: 2023-08-09

## 2023-08-09 RX ORDER — LIDOCAINE HYDROCHLORIDE 20 MG/ML
INJECTION, SOLUTION EPIDURAL; INFILTRATION; INTRACAUDAL; PERINEURAL AS NEEDED
Status: DISCONTINUED | OUTPATIENT
Start: 2023-08-09 | End: 2023-08-09

## 2023-08-09 RX ORDER — PROPOFOL 10 MG/ML
INJECTION, EMULSION INTRAVENOUS AS NEEDED
Status: DISCONTINUED | OUTPATIENT
Start: 2023-08-09 | End: 2023-08-09

## 2023-08-09 RX ORDER — ACETAMINOPHEN 325 MG/1
975 TABLET ORAL EVERY 6 HOURS PRN
Status: DISCONTINUED | OUTPATIENT
Start: 2023-08-09 | End: 2023-08-09 | Stop reason: HOSPADM

## 2023-08-09 RX ORDER — FENTANYL CITRATE/PF 50 MCG/ML
25 SYRINGE (ML) INJECTION
Status: DISCONTINUED | OUTPATIENT
Start: 2023-08-09 | End: 2023-08-09 | Stop reason: HOSPADM

## 2023-08-09 RX ORDER — BUPIVACAINE HYDROCHLORIDE 5 MG/ML
INJECTION, SOLUTION EPIDURAL; INTRACAUDAL AS NEEDED
Status: DISCONTINUED | OUTPATIENT
Start: 2023-08-09 | End: 2023-08-09 | Stop reason: HOSPADM

## 2023-08-09 RX ORDER — ONDANSETRON 2 MG/ML
4 INJECTION INTRAMUSCULAR; INTRAVENOUS ONCE AS NEEDED
Status: DISCONTINUED | OUTPATIENT
Start: 2023-08-09 | End: 2023-08-09 | Stop reason: HOSPADM

## 2023-08-09 RX ORDER — IBUPROFEN 600 MG/1
600 TABLET ORAL EVERY 6 HOURS PRN
Qty: 30 TABLET | Refills: 1 | Status: SHIPPED | OUTPATIENT
Start: 2023-08-09

## 2023-08-09 RX ORDER — OXYCODONE HYDROCHLORIDE 5 MG/1
5 TABLET ORAL EVERY 6 HOURS PRN
Qty: 12 TABLET | Refills: 0 | Status: SHIPPED | OUTPATIENT
Start: 2023-08-09 | End: 2023-08-12

## 2023-08-09 RX ORDER — ONDANSETRON 2 MG/ML
4 INJECTION INTRAMUSCULAR; INTRAVENOUS EVERY 6 HOURS PRN
Status: DISCONTINUED | OUTPATIENT
Start: 2023-08-09 | End: 2023-08-09 | Stop reason: HOSPADM

## 2023-08-09 RX ORDER — ONDANSETRON 2 MG/ML
INJECTION INTRAMUSCULAR; INTRAVENOUS AS NEEDED
Status: DISCONTINUED | OUTPATIENT
Start: 2023-08-09 | End: 2023-08-09

## 2023-08-09 RX ORDER — DEXAMETHASONE SODIUM PHOSPHATE 10 MG/ML
INJECTION, SOLUTION INTRAMUSCULAR; INTRAVENOUS AS NEEDED
Status: DISCONTINUED | OUTPATIENT
Start: 2023-08-09 | End: 2023-08-09

## 2023-08-09 RX ORDER — MAGNESIUM HYDROXIDE 1200 MG/15ML
LIQUID ORAL AS NEEDED
Status: DISCONTINUED | OUTPATIENT
Start: 2023-08-09 | End: 2023-08-09 | Stop reason: HOSPADM

## 2023-08-09 RX ORDER — ROCURONIUM BROMIDE 10 MG/ML
INJECTION, SOLUTION INTRAVENOUS AS NEEDED
Status: DISCONTINUED | OUTPATIENT
Start: 2023-08-09 | End: 2023-08-09

## 2023-08-09 RX ORDER — KETOROLAC TROMETHAMINE 30 MG/ML
INJECTION, SOLUTION INTRAMUSCULAR; INTRAVENOUS AS NEEDED
Status: DISCONTINUED | OUTPATIENT
Start: 2023-08-09 | End: 2023-08-09

## 2023-08-09 RX ORDER — MIDAZOLAM HYDROCHLORIDE 2 MG/2ML
INJECTION, SOLUTION INTRAMUSCULAR; INTRAVENOUS AS NEEDED
Status: DISCONTINUED | OUTPATIENT
Start: 2023-08-09 | End: 2023-08-09

## 2023-08-09 RX ADMIN — SUGAMMADEX 200 MG: 100 INJECTION, SOLUTION INTRAVENOUS at 15:34

## 2023-08-09 RX ADMIN — ROCURONIUM BROMIDE 50 MG: 10 INJECTION, SOLUTION INTRAVENOUS at 14:22

## 2023-08-09 RX ADMIN — FENTANYL CITRATE 25 MCG: 50 INJECTION INTRAMUSCULAR; INTRAVENOUS at 14:31

## 2023-08-09 RX ADMIN — ONDANSETRON 4 MG: 2 INJECTION INTRAMUSCULAR; INTRAVENOUS at 15:14

## 2023-08-09 RX ADMIN — DEXAMETHASONE SODIUM PHOSPHATE 4 MG: 10 INJECTION INTRAMUSCULAR; INTRAVENOUS at 14:22

## 2023-08-09 RX ADMIN — MIDAZOLAM 2 MG: 1 INJECTION INTRAMUSCULAR; INTRAVENOUS at 14:16

## 2023-08-09 RX ADMIN — SODIUM CHLORIDE 125 ML/HR: 0.9 INJECTION, SOLUTION INTRAVENOUS at 12:56

## 2023-08-09 RX ADMIN — FENTANYL CITRATE 50 MCG: 50 INJECTION INTRAMUSCULAR; INTRAVENOUS at 14:22

## 2023-08-09 RX ADMIN — LIDOCAINE HYDROCHLORIDE 80 MG: 20 INJECTION, SOLUTION EPIDURAL; INFILTRATION; INTRACAUDAL at 14:22

## 2023-08-09 RX ADMIN — KETOROLAC TROMETHAMINE 30 MG: 30 INJECTION, SOLUTION INTRAMUSCULAR at 15:31

## 2023-08-09 RX ADMIN — FENTANYL CITRATE 25 MCG: 50 INJECTION INTRAMUSCULAR; INTRAVENOUS at 15:19

## 2023-08-09 RX ADMIN — SODIUM CHLORIDE: 0.9 INJECTION, SOLUTION INTRAVENOUS at 15:05

## 2023-08-09 RX ADMIN — ACETAMINOPHEN 325MG 975 MG: 325 TABLET ORAL at 17:29

## 2023-08-09 RX ADMIN — PROPOFOL 140 MG: 10 INJECTION, EMULSION INTRAVENOUS at 14:22

## 2023-08-09 NOTE — ANESTHESIA PREPROCEDURE EVALUATION
Procedure:  LAPAROSCOPY DIAGNOSTIC (Uterus)  SALPINGECTOMY, LAP (Bilateral: Uterus)  ABLATION ENDOMETRIAL IRMA (Uterus)  (D&C) W/ HYSTEROSCOPY (Uterus)    Relevant Problems   CARDIO   (+) Migraines      NEURO/PSYCH   (+) Migraines      Respiratory   (+) Bronchitis      Other   (+) Abnormal Pap smear of cervix        Physical Exam    Airway    Mallampati score: II  TM Distance: >3 FB  Neck ROM: full     Dental   No notable dental hx     Cardiovascular  Rhythm: regular, Rate: normal, Cardiovascular exam normal    Pulmonary  Pulmonary exam normal Breath sounds clear to auscultation,     Other Findings        Anesthesia Plan  ASA Score- 2     Anesthesia Type- general with ASA Monitors. Additional Monitors:   Airway Plan: ETT. Plan Factors-    Chart reviewed. Existing labs reviewed. Patient summary reviewed. Patient is not a current smoker. Patient not instructed to abstain from smoking on day of procedure. Patient did not smoke on day of surgery. There is medical exclusion for perioperative obstructive sleep apnea risk education. Induction- intravenous. Postoperative Plan-     Informed Consent- Anesthetic plan and risks discussed with patient Reagan Mac (Friend)).

## 2023-08-09 NOTE — ANESTHESIA POSTPROCEDURE EVALUATION
Post-Op Assessment Note    CV Status:  Stable    Pain management: adequate     Mental Status:  Alert and awake   Hydration Status:  Euvolemic   PONV Controlled:  Controlled   Airway Patency:  Patent      Post Op Vitals Reviewed: Yes      Staff: Anesthesiologist         No notable events documented.     BP      Temp      Pulse     Resp      SpO2      /68   Pulse 63   Temp 98 °F (36.7 °C) (Temporal)   Resp 16   Ht 5' 3" (1.6 m)   Wt 60.9 kg (134 lb 4.2 oz)   LMP 06/12/2023 (Approximate)   SpO2 99%   BMI 23.78 kg/m²

## 2023-08-09 NOTE — OP NOTE
OPERATIVE REPORT  PATIENT NAME: Alejandro Carias    :  1975  MRN: [id number]  Pt Location: [facility] OR ROOM 06    SURGERY DATE: 2023    Surgeon(s) and Role:     * JAVAN Noriega DO - [facility] - Assisting    Preop Diagnosis:  Menorrhagia with irregular cycle [N92.1]  Encounter for sterilization [Z30.2]    Post-Op Diagnosis Codes:     * Menorrhagia with irregular cycle [N92.1]     * Encounter for sterilization [Z30.2]    Procedure(s):  LAPAROSCOPY DIAGNOSTIC  Bilateral - SALPINGECTOMY. LAP  ABLATION ENDOMETRIAL IRMA  (D&C) W/ HYSTEROSCOPY    Specimen(s):  ID Type Source Tests Collected by Time Destination   1 : bilateral fallopian tubes Tissue Soft Tissue, Other TISSUE EXAM C Cristine Noriega DO 2023 1453    2 : EMC and polyps Tissue Soft Tissue, Other TISSUE EXAM C Cristine Noriega DO 2023 1513        Estimated Blood Loss:   Minimal    Drains:  * No LDAs found *    Anesthesia Type:   General    Operative Indications:  Menorrhagia with irregular cycle [N92.1]  Encounter for sterilization [Z30.2]    Operative Findings:  Normal external female genitalia- no evidence of lesions or lacerations   Bimanual exam revealed an anteverted uterus, normal size and contour, freely mobile. No adnexal masses appreciated   Speculum exam revealed a normal vagina and cervix. Cervix notable for nabothian cyst at 12 o'clock. No evidence of bleeding, lesions or lacerations. Uterus sounded to 7 cm. Hysteroscopic examination revealed proliferative endometrial lining. Polyp found at the 9:00 position. Fundal fibroid was noted. Bilateral ostia were both visualized. Laparoscopic exam revealed normal bowel, bladder, vasculature and liver edge. There was no evidence of bowel injury under the umbilical port with trocar entry. Right omental adhesion noted. On inspection of the pelvis the bilateral fallopian tubes and ovaries were grossly normal in appearance.    Uterus was noted to have a 3cm posterior fibroid. Masters window noted in the posterior cul-de-sac. Excellent hemostasis at surgical sites. Complications:   None    Procedure and Technique:    Operative Technique  Bladder drained for 300cc of urine. A bivalved speculum was inserted into the vagina was used to visualize the anterior lip of the cervix, which was then grasped with a single toothed tenaculum. A cone manipulator was inserted for uterine manipulation. Sterile gloves were exchanged and attention was turned to the abdomen. A 10 mm skin incision was made in the umbilicus with a scalpel for introduction of a 10 mm trochar under direct visualization. Good intraperitoneal location was confirmed and pneumoperitoneum was created to maximal pressure of 15 mmHg. The patient was placed in Trendelenburg and the above-mentioned findings were noted. Two 5mm additional trocars were placed in a similar fashion in the right and left lower quadrants, approximately 2cm superior and medial to the anterior superior iliac spine. Attention was first turned to the left adnexa. The left fallopian tube was grasped at its fimbriated end with a blunt grasper and elevated to visualize the mesosalpinx. Enseal device was used to ligate along the mesosalpinx, working proximally. The left fallopian tube was transected approximately 1 cm from the level of the cornua. Bleeding was noted to be stable. Attention was then turned to the contralateral fallopian tube, which were amputated in similar fashion. Hemostasis was noted to be adequate. The bilateral fallopian tubes were removed intact using the Endocatch bag. Pneumoperitoneum was allowed to escape. Trocars were removed from the abdomen. The incisions were closed using 4-0 Monocryl. Attention was then turned back to the pelvis. The manipulator was removed. A weighted speculum was placed in the patient's vagina and the anterior lip of cervix is visualized with the [facility] retractor.  The anterior lip of the cervix was grasped using a single toothed tenaculum. The uterus was sounded to 7cm. The cervix was then gradually dilated to 18F using Frazier dilators. The cervix was measured at 4cm. A 5mm hysteroscope was inserted into the uterine cavity. The endometrial lining was noted to be proliferative appearing, ostia were visualized. Endometrial curettings were obtained using a medium-sized sharp curette. Curettage of the endometrial cavity was undertaken in a systemic fashion, encompassing all 360 degrees of the endometrial cavity. These were sent for routine pathology. The hysteroscope was then re-introduced under direct visualization, again using normal saline solution as the distention media. Entire uterine cavity was inspected in a systematic manner. Adequate curetting was confirmed and hysteroscope was withdrawn. The Olimpia endometrial ablation device was inserted into the endometrial cavity. The device was deployed and the cervical balloon was inflated. Safety checks were performed and the device was deployed. A full 2 minutes cycle was completed. The Olimpia device was then removed from the endometrial cavity. The hysteroscope was then re-introduced under direct visualization, again using normal saline solution as the distention media. Entire uterine cavity was inspected in a systematic manner. Adequate ablation was noted. The single toothed tenaculum was removed from the anterior lip of the cervix. Good hemostasis was confirmed at the tenaculum puncture sites. Weighted speculum was then removed from the vagina. At the conclusion of the procedure, all needle, sponge, and instrument counts were noted to be correct x2. Patient tolerated the procedure well and was transferred to PACU in stable condition with plan for same day discharge. She was instructed to follow up with Dr Alvino Ross as scheduled. Dr. Alvino Ross was present and participated in all key portions of the case.     Patient Disposition:  PACU     SIGNATURE: Yvonne Paris MD  DATE: August 9, 2023  TIME: 3:43 PM

## 2023-08-10 ENCOUNTER — TELEPHONE (OUTPATIENT)
Dept: GYNECOLOGY | Facility: CLINIC | Age: 48
End: 2023-08-10

## 2023-08-10 NOTE — TELEPHONE ENCOUNTER
We are calling to follow up on your surgery with Dr. Kia Chau on 08/09/2023.     1. What is your pain level from 1/10              1/10         Are you taking anything for the pain           Advil    2. Are you having any vaginal bleeding?            no        If so how much bleeding         none    3. Do you have a fever?         no    4. How are your incisions? N/A    5. How are your bowels and Bladder? WNL    6. Are you having any shortness of breath?        no    7. Are you having any nausea or vomiting?       no    8. Do you have any vaginal packing, ortiz catheter that needs to be removed?        N?A

## 2023-08-23 ENCOUNTER — OFFICE VISIT (OUTPATIENT)
Dept: GYNECOLOGY | Facility: CLINIC | Age: 48
End: 2023-08-23

## 2023-08-23 VITALS — BODY MASS INDEX: 23.6 KG/M2 | SYSTOLIC BLOOD PRESSURE: 120 MMHG | WEIGHT: 133.2 LBS | DIASTOLIC BLOOD PRESSURE: 80 MMHG

## 2023-08-23 DIAGNOSIS — Z98.890 STATUS POST ENDOMETRIAL ABLATION: ICD-10-CM

## 2023-08-23 DIAGNOSIS — Z90.79 STATUS POST BILATERAL SALPINGECTOMY: ICD-10-CM

## 2023-08-23 DIAGNOSIS — Z98.890 POSTOPERATIVE STATE: Primary | ICD-10-CM

## 2023-08-23 DIAGNOSIS — N80.9 ENDOMETRIOSIS DETERMINED BY LAPAROSCOPY: ICD-10-CM

## 2023-08-23 PROCEDURE — 99024 POSTOP FOLLOW-UP VISIT: CPT | Performed by: OBSTETRICS & GYNECOLOGY

## 2023-08-23 NOTE — PROGRESS NOTES
Assessment   2-week postop visit  Status post bilateral salpingectomy via laparoscopy  Status post endometrial ablation   Doing well postoperatively. Operative findings again reviewed. Pathology report discussed. Plan     1. Continue any current medications. 2. Wound care discussed. 3. Activity restrictions: no lifting more than 10 pounds  4. Anticipated return to work: now. 5. Follow up: 3 Months for follow-up care. Subjective 2-week postop visit     Gustavo Estevez is a 41 y.o. female who presents to the office 2 weeks status post Diagnostic laparoscopy with bilateral salpingectomy for permanent sterilization. Also Olimpia endometrial ablation for abnormal uterine bleeding and requested sterilization. Eating a regular diet without difficulty. Bowel movements are normal. The patient is not having any pain. Review of Systems  Pertinent items are noted in HPI. Objective no acute distress     /80 (BP Location: Right arm, Patient Position: Sitting, Cuff Size: Standard)   Wt 60.4 kg (133 lb 3.2 oz)   LMP 06/12/2023 (Approximate)   BMI 23.60 kg/m²   General:  alert and oriented, in no acute distress   Abdomen: soft, bowel sounds active, non-tender   Incision:   healing well, no drainage, no erythema, no hernia, no seroma, no swelling, no dehiscence, incision well approximated       Reviewed surgical photographs and operative findings. Findings with evidence of stage I pelvic endometriosis, with omental adhesions and peritoneal erosion, Ejrrell's window noted beneath uterus. Discussed reviewed these findings. Follow-up in 3 months time. She will monitor her menstrual flow which hopefully will be dissipated or improved. All questions answered.

## 2023-09-21 ENCOUNTER — OFFICE VISIT (OUTPATIENT)
Dept: FAMILY MEDICINE CLINIC | Facility: CLINIC | Age: 48
End: 2023-09-21
Payer: COMMERCIAL

## 2023-09-21 VITALS
OXYGEN SATURATION: 99 % | DIASTOLIC BLOOD PRESSURE: 78 MMHG | HEART RATE: 92 BPM | TEMPERATURE: 97.6 F | BODY MASS INDEX: 22.88 KG/M2 | SYSTOLIC BLOOD PRESSURE: 108 MMHG | WEIGHT: 134 LBS | HEIGHT: 64 IN | RESPIRATION RATE: 16 BRPM

## 2023-09-21 DIAGNOSIS — M79.644 THUMB PAIN, RIGHT: ICD-10-CM

## 2023-09-21 DIAGNOSIS — M25.561 CHRONIC PAIN OF BOTH KNEES: Primary | ICD-10-CM

## 2023-09-21 DIAGNOSIS — G89.29 CHRONIC PAIN OF BOTH KNEES: Primary | ICD-10-CM

## 2023-09-21 DIAGNOSIS — M25.562 CHRONIC PAIN OF BOTH KNEES: Primary | ICD-10-CM

## 2023-09-21 PROCEDURE — 99214 OFFICE O/P EST MOD 30 MIN: CPT | Performed by: FAMILY MEDICINE

## 2023-09-21 RX ORDER — MELOXICAM 15 MG/1
15 TABLET ORAL DAILY
Qty: 30 TABLET | Refills: 0 | Status: SHIPPED | OUTPATIENT
Start: 2023-09-21

## 2023-09-21 NOTE — PROGRESS NOTES
Name: Chela Puga      : 1975      MRN: [id number]  Encounter Provider: Michael Nunez DO  Encounter Date: 2023   Encounter department: [address] [place] [address] [place]   Discussed diagnostic and treatment options with patient. Patient is being sent for x-ray of the knees bilaterally and right hand. We will heed results. Patient restarted on meloxicam 15 mg 1 daily with food and instructed to discontinue ibuprofen and avoid naproxen. Patient may apply ice as needed. Return to the office in 3 to 4 weeks or call sooner as needed. If symptoms persist discussed referral to orthopedics. 1. Chronic pain of both knees  -     XR knee 3 vw left non injury; Future; Expected date: 2023  -     XR knee 3 vw right non injury; Future; Expected date: 2023  -     meloxicam (MOBIC) 15 mg tablet; Take 1 tablet (15 mg total) by mouth daily    2. Thumb pain, right  -     XR hand 3+ vw right; Future; Expected date: 2023  -     meloxicam (MOBIC) 15 mg tablet; Take 1 tablet (15 mg total) by mouth daily           Subjective      Patient complains of left knee pain for the past year and right knee pains over the past couple months. She denies any specific injury or fall. Patient denies swelling or locking although occasionally right knee giving out. Patient also complains of right thumb pain for the past 2 weeks. Patient is right-handed and spends a lot of time working on the computer and using a mouse. Knee Pain   There was no injury mechanism. The pain is present in the left knee and right knee. The quality of the pain is described as aching (sharp). The pain has been worsening since onset. Pertinent negatives include no inability to bear weight, loss of motion, muscle weakness, numbness or tingling. The symptoms are aggravated by movement and weight bearing. She has tried rest (brace) for the symptoms. The treatment provided mild relief.    Hand Pain   Incident onset: 2 weeks. The injury mechanism was repetitive motion. Pain location: right thumb. The quality of the pain is described as aching. The pain does not radiate. The pain has been constant since the incident. Pertinent negatives include no muscle weakness, numbness or tingling. The symptoms are aggravated by movement. She has tried nothing for the symptoms. Review of Systems   Neurological: Negative for tingling and numbness. Current Outpatient Medications on File Prior to Visit   Medication Sig   • ASHWAGANDHA PO Take 450 mg by mouth 2 (two) times a day   • CALCIUM PO Take by mouth   • Cholecalciferol (VITAMIN D3) 10 MCG (400 UNIT) CAPS Take by mouth   • Echinacea 400 MG CAPS Take 1 capsule by mouth 2 (two) times a day   • ibuprofen (MOTRIN) 200 mg tablet Take 200 mg by mouth once   • ibuprofen (MOTRIN) 600 mg tablet Take 1 tablet (600 mg total) by mouth every 6 (six) hours as needed for mild pain   • Multiple Vitamin (MULTIVITAMIN) tablet Take 1 tablet by mouth daily   • Omega-3 Fatty Acids (FISH OIL PO) Take by mouth   • Probiotic Product (PROBIOTIC PO) Take by mouth   • VITAMIN E PO Take by mouth   • Tri-Lo-Lizette 0.18/0.215/0.25 MG-25 MCG per tablet Take 1 tablet by mouth daily (Patient not taking: Reported on 9/21/2023)   • triamcinolone (KENALOG) 0.1 % cream Apply topically 2 (two) times a day       Objective     /78 (BP Location: Left arm, Patient Position: Sitting, Cuff Size: Standard)   Pulse 92   Temp 97.6 °F (36.4 °C) (Tympanic)   Resp 16   Ht 5' 4" (1.626 m)   Wt 60.8 kg (134 lb)   SpO2 99%   BMI 23.00 kg/m²     Physical Exam  Constitutional:       General: She is not in acute distress. Appearance: Normal appearance. HENT:      Head: Normocephalic. Mouth/Throat:      Mouth: Mucous membranes are moist.   Eyes:      General: No scleral icterus. Conjunctiva/sclera: Conjunctivae normal.   Cardiovascular:      Rate and Rhythm: Normal rate and regular rhythm. Pulmonary:      Effort: Pulmonary effort is normal.      Breath sounds: Normal breath sounds. Abdominal:      Palpations: Abdomen is soft. Tenderness: There is no abdominal tenderness. Musculoskeletal:         General: Tenderness present. Cervical back: Neck supple. Right lower leg: No edema. Left lower leg: No edema. Comments: Right thumb tenderness at [facility] joint. Negative swelling. Bilateral knees full range of motion intact. Mild bilateral joint line tenderness. Mild popliteal tenderness. Negative patellar compression tenderness. Ligaments appear intact. Lymphadenopathy:      Cervical: No cervical adenopathy. Skin:     General: Skin is warm and dry. Neurological:      General: No focal deficit present. Mental Status: She is alert and oriented to person, place, and time. Psychiatric:         Mood and Affect: Mood normal.         Behavior: Behavior normal.         Thought Content:  Thought content normal.         Judgment: Judgment normal.       Clair Johnson,

## 2023-09-22 ENCOUNTER — HOSPITAL ENCOUNTER (OUTPATIENT)
Dept: RADIOLOGY | Facility: IMAGING CENTER | Age: 48
End: 2023-09-22
Payer: COMMERCIAL

## 2023-09-22 DIAGNOSIS — M25.561 CHRONIC PAIN OF BOTH KNEES: ICD-10-CM

## 2023-09-22 DIAGNOSIS — M79.644 THUMB PAIN, RIGHT: ICD-10-CM

## 2023-09-22 DIAGNOSIS — G89.29 CHRONIC PAIN OF BOTH KNEES: ICD-10-CM

## 2023-09-22 DIAGNOSIS — M25.562 CHRONIC PAIN OF BOTH KNEES: ICD-10-CM

## 2023-09-22 PROCEDURE — 73562 X-RAY EXAM OF KNEE 3: CPT

## 2023-09-22 PROCEDURE — 73130 X-RAY EXAM OF HAND: CPT

## 2023-10-31 ENCOUNTER — OFFICE VISIT (OUTPATIENT)
Dept: FAMILY MEDICINE CLINIC | Facility: CLINIC | Age: 48
End: 2023-10-31
Payer: COMMERCIAL

## 2023-10-31 VITALS
BODY MASS INDEX: 22.77 KG/M2 | TEMPERATURE: 98 F | SYSTOLIC BLOOD PRESSURE: 108 MMHG | WEIGHT: 133.4 LBS | OXYGEN SATURATION: 100 % | HEIGHT: 64 IN | DIASTOLIC BLOOD PRESSURE: 60 MMHG | HEART RATE: 84 BPM

## 2023-10-31 DIAGNOSIS — M25.562 CHRONIC PAIN OF BOTH KNEES: Primary | ICD-10-CM

## 2023-10-31 DIAGNOSIS — G89.29 CHRONIC PAIN OF BOTH KNEES: Primary | ICD-10-CM

## 2023-10-31 DIAGNOSIS — M25.561 CHRONIC PAIN OF BOTH KNEES: Primary | ICD-10-CM

## 2023-10-31 DIAGNOSIS — M79.644 PAIN OF RIGHT THUMB: ICD-10-CM

## 2023-10-31 PROCEDURE — 99213 OFFICE O/P EST LOW 20 MIN: CPT | Performed by: FAMILY MEDICINE

## 2023-10-31 NOTE — PROGRESS NOTES
Assessment/Plan:  Patient is being referred to [facility] orthopedics for further evaluation and treatment. Return to the office as needed. No problem-specific Assessment & Plan notes found for this encounter. Diagnoses and all orders for this visit:    Chronic pain of both knees  -     Ambulatory Referral to Orthopedic Surgery; Future    Pain of right thumb  -     Ambulatory Referral to Orthopedic Surgery; Future          Subjective:      Patient ID: [place] is a 57 y.o. female. Patient is being seen in follow-up for left greater than right knee pain and right thumb pain chronically. X-rays reviewed which were negative. Patient tried meloxicam without significant relief. Patient denies family history of rheumatoid arthritis or connective tissue disorders. Knee Pain   There was no injury mechanism. The pain is present in the left knee and right knee. The quality of the pain is described as aching. Pertinent negatives include no inability to bear weight, loss of motion, muscle weakness, numbness or tingling. The symptoms are aggravated by movement and weight bearing. She has tried NSAIDs and ice (brace) for the symptoms. The treatment provided mild relief. The following portions of the patient's history were reviewed and updated as appropriate: allergies, current medications, past family history, past medical history, past social history, past surgical history, and problem list.    Review of Systems   Neurological:  Negative for tingling and numbness. Objective:      /60   Pulse 84   Temp 98 °F (36.7 °C)   Ht 5' 4" (1.626 m)   Wt 60.5 kg (133 lb 6.4 oz)   LMP 06/12/2023 (Approximate)   SpO2 100%   BMI 22.90 kg/m²          Physical Exam  Constitutional:       General: She is not in acute distress. Appearance: Normal appearance. HENT:      Head: Normocephalic. Eyes:      General: No scleral icterus.      Conjunctiva/sclera: Conjunctivae normal.   Cardiovascular: Rate and Rhythm: Normal rate and regular rhythm. Pulmonary:      Effort: Pulmonary effort is normal.      Breath sounds: Normal breath sounds. Abdominal:      Palpations: Abdomen is soft. Tenderness: There is no abdominal tenderness. Musculoskeletal:         General: No tenderness. Cervical back: Neck supple. Right lower leg: No edema. Left lower leg: No edema. Comments: Knees range of motion intact and nontender. Right thumb tenderness at the MP joint. Lymphadenopathy:      Cervical: No cervical adenopathy. Skin:     General: Skin is warm and dry. Neurological:      General: No focal deficit present. Mental Status: She is alert and oriented to person, place, and time. Psychiatric:         Mood and Affect: Mood normal.         Thought Content:  Thought content normal.         Judgment: Judgment normal.

## 2023-11-03 ENCOUNTER — OFFICE VISIT (OUTPATIENT)
Dept: OBGYN CLINIC | Facility: CLINIC | Age: 48
End: 2023-11-03

## 2023-11-03 VITALS
HEIGHT: 64 IN | DIASTOLIC BLOOD PRESSURE: 80 MMHG | SYSTOLIC BLOOD PRESSURE: 100 MMHG | WEIGHT: 122 LBS | BODY MASS INDEX: 20.83 KG/M2

## 2023-11-03 DIAGNOSIS — M65.311 TRIGGER THUMB OF RIGHT HAND: Primary | ICD-10-CM

## 2023-11-03 RX ORDER — LIDOCAINE HYDROCHLORIDE 10 MG/ML
1 INJECTION, SOLUTION INFILTRATION; PERINEURAL
Status: COMPLETED | OUTPATIENT
Start: 2023-11-03 | End: 2023-11-03

## 2023-11-03 RX ORDER — BETAMETHASONE SODIUM PHOSPHATE AND BETAMETHASONE ACETATE 3; 3 MG/ML; MG/ML
6 INJECTION, SUSPENSION INTRA-ARTICULAR; INTRALESIONAL; INTRAMUSCULAR; SOFT TISSUE
Status: COMPLETED | OUTPATIENT
Start: 2023-11-03 | End: 2023-11-03

## 2023-11-03 RX ADMIN — BETAMETHASONE SODIUM PHOSPHATE AND BETAMETHASONE ACETATE 6 MG: 3; 3 INJECTION, SUSPENSION INTRA-ARTICULAR; INTRALESIONAL; INTRAMUSCULAR; SOFT TISSUE at 13:30

## 2023-11-03 RX ADMIN — LIDOCAINE HYDROCHLORIDE 1 ML: 10 INJECTION, SOLUTION INFILTRATION; PERINEURAL at 13:30

## 2023-11-03 NOTE — PROGRESS NOTES
Patient Name:  Katina Bosch  MRN:  [id number]    Assessment & Plan     Right thumb pain. Likely early onset trigger thumb. Patient does exhibit tenderness over the A1 pulley of the right thumb. Although she exhibits no mechanical signs of a trigger thumb at this time I do believe she does have an early onset trigger thumb. Patient was offered and accepted a corticosteroid injection into the right thumb A1 pulley as a diagnostic and therapeutic measure. Activities as tolerated with modification to avoid pain. Follow-up in 6 weeks. Chief Complaint     Right thumb pain    History of the Present Illness     Katina Bosch is a 57 y.o. right-hand-dominant female who reports to the office today for evaluation of her right thumb. She notes an onset of right thumb pain a few months ago. She denies any injury or trauma. She notes pain in the region of the thumb MCP joint on the dorsal and volar surface. Pain is worse with repetitive use and grasping objects. Pain was worse with gardening and weeding. She notes sharp pain with grasping and lifting objects. She states occasionally the pain was so severe she dropped the objects. She denies any instability. She denies any numbness and tingling. She did try meloxicam which was prescribed by her PCP without any improvement. She denies any clicking popping locking or triggering of the thumb. No fevers or chills. Physical Exam     /80   Ht 5' 4" (1.626 m)   Wt 55.3 kg (122 lb)   LMP 06/12/2023 (Approximate)   BMI 20.94 kg/m²     Right thumb: No gross deformity. Skin intact. No erythema ecchymosis or swelling. There is a small palpable nodule about the A1 pulley of the thumb which is tender to palpation. No tenderness over the MCP CMC and IP joint of the thumb. No tenderness first metacarpal CMC joint as well. Minimal discomfort over the first dorsal compartment.   Negative CMC grind test.  Slight discomfort with Nolia Gory test.  No clicking popping locking or triggering with range of motion. Sensation is intact distally. Brisk capillary refill. Eyes: Anicteric sclerae. ENT: Trachea midline. Lungs: Normal respiratory effort. CV: Capillary refill is less than 2 seconds. Skin: Intact without erythema. Lymph: No palpable lymphadenopathy. Neuro: Sensation is grossly intact to light touch. Psych: Mood and affect are appropriate. Data Review     I have personally reviewed pertinent films in PACS, and my interpretation follows:    X-rays right hand 9/22/2023: No acute osseous abnormality. No fracture or dislocation. No significant degenerative changes.     Past Medical History:   Diagnosis Date    Abnormal Pap smear of cervix     Bronchitis     Endometriosis determined by laparoscopy 8/23/2023    History of transfusion 02/14/2013    Migraine     Pregnant 02/14/2013    vaginal delivery       Past Surgical History:   Procedure Laterality Date    DC HYSTEROSCOPY BX ENDOMETRIUM&/POLYPC W/WO D&C N/A 8/9/2023    Procedure: (D&C) W/ HYSTEROSCOPY;  Surgeon: Zach Kingsley DO;  Location: [facility];  Service: Gynecology    DC HYSTEROSCOPY ENDOMETRIAL ABLATION N/A 8/9/2023    Procedure: ABLATION ENDOMETRIAL IRMA;  Surgeon: Zach Kingsley DO;  Location: [facility];  Service: Gynecology    DC LAPAROSCOPY W/RMVL ADNEXAL STRUCTURES Bilateral 8/9/2023    Procedure: SALPINGECTOMY, LAP;  Surgeon: Zach Kingsley DO;  Location: [facility];  Service: Gynecology    DC LAPS ABD PRTM&OMENTUM DX W/WO SPEC BR/WA [facility] N/A 8/9/2023    Procedure: LAPAROSCOPY DIAGNOSTIC;  Surgeon: Zach Kingsley DO;  Location: [facility];  Service: Gynecology    WISDOM TOOTH EXTRACTION         No Known Allergies    Current Outpatient Medications on File Prior to Visit   Medication Sig Dispense Refill    ASHWAGANDHA PO Take 450 mg by mouth 2 (two) times a day      CALCIUM PO Take by mouth      Cholecalciferol (VITAMIN D3) 10 MCG (400 UNIT) CAPS Take by mouth      Echinacea 400 MG CAPS Take 1 capsule by mouth 2 (two) times a day      Multiple Vitamin (MULTIVITAMIN) tablet Take 1 tablet by mouth daily      Omega-3 Fatty Acids (FISH OIL PO) Take by mouth      Probiotic Product (PROBIOTIC PO) Take by mouth      VITAMIN E PO Take by mouth      ibuprofen (MOTRIN) 200 mg tablet Take 200 mg by mouth once (Patient not taking: Reported on 10/31/2023)      ibuprofen (MOTRIN) 600 mg tablet Take 1 tablet (600 mg total) by mouth every 6 (six) hours as needed for mild pain (Patient not taking: Reported on 10/31/2023) 30 tablet 1    meloxicam (MOBIC) 15 mg tablet Take 1 tablet (15 mg total) by mouth daily (Patient not taking: Reported on 10/31/2023) 30 tablet 0    Tri-Lo-Lizette 0.18/0.215/0.25 MG-25 MCG per tablet Take 1 tablet by mouth daily (Patient not taking: Reported on 9/21/2023) 84 tablet 3    triamcinolone (KENALOG) 0.1 % cream Apply topically 2 (two) times a day (Patient not taking: Reported on 10/31/2023) 30 g 0     No current facility-administered medications on file prior to visit. Social History     Tobacco Use    Smoking status: Former    Smokeless tobacco: Former   Vaping Use    Vaping Use: Never used   Substance Use Topics    Alcohol use: Not Currently     Comment: social    Drug use: No       Family History   Problem Relation Age of Onset    No Known Problems Mother     No Known Problems Father     No Known Problems Paternal Uncle     No Known Problems Sister     No Known Problems Daughter     No Known Problems Maternal Grandmother     No Known Problems Maternal Grandfather     No Known Problems Paternal Grandmother     No Known Problems Paternal Grandfather     No Known Problems Maternal Aunt     No Known Problems Maternal Aunt     No Known Problems Maternal Aunt     No Known Problems Maternal Aunt     Leukemia Paternal Aunt     No Known Problems Paternal Aunt        Review of Systems     As stated in the HPI. All other systems reviewed and are negative.       Hand/upper extremity injection: R thumb A1  Procedure Details  Condition:trigger finger Location: thumb - R thumb A1   Needle size: 25 G  Ultrasound guidance: no  Approach: volar  Medications administered: 1 mL lidocaine 1 %; 6 mg betamethasone acetate-betamethasone sodium phosphate 6 (3-3) mg/mL  Patient tolerance: patient tolerated the procedure well with no immediate complications  Dressing:  Sterile dressing applied

## 2023-11-06 ENCOUNTER — OFFICE VISIT (OUTPATIENT)
Dept: OBGYN CLINIC | Facility: CLINIC | Age: 48
End: 2023-11-06
Payer: COMMERCIAL

## 2023-11-06 VITALS
HEIGHT: 64 IN | WEIGHT: 122 LBS | SYSTOLIC BLOOD PRESSURE: 98 MMHG | BODY MASS INDEX: 20.83 KG/M2 | DIASTOLIC BLOOD PRESSURE: 62 MMHG

## 2023-11-06 DIAGNOSIS — M25.562 CHRONIC PAIN OF BOTH KNEES: Primary | ICD-10-CM

## 2023-11-06 DIAGNOSIS — M25.561 CHRONIC PAIN OF BOTH KNEES: Primary | ICD-10-CM

## 2023-11-06 DIAGNOSIS — G89.29 CHRONIC PAIN OF BOTH KNEES: Primary | ICD-10-CM

## 2023-11-06 PROCEDURE — 99214 OFFICE O/P EST MOD 30 MIN: CPT | Performed by: PHYSICIAN ASSISTANT

## 2023-11-06 NOTE — PROGRESS NOTES
Patient Name:  Darlys Prader  MRN:  [id number]    Assessment & Plan     Bilateral chronic knee pain. Possible patellofemoral dysfunction versus medial meniscus tears. Patient exhibits chronic bilateral knee pain with the left knee bothering her more than the right. She notes intermittent severe pain without history of any injury. She does exhibit tenderness along the medial joint lines bilaterally suggestive of possible medial meniscus tears. Recommend initial course of conservative management consisting of physical therapy. Referral placed today. Over-the-counter anti-inflammatories as needed. Activities as tolerated with modification to avoid pain. Follow-up in 6 weeks. Discussed obtaining MRI at that time as indicated. Chief Complaint     Bilateral knee pain    History of the Present Illness     Darlys Prader is a 57 y.o. female who reports to the office today for evaluation of her bilateral knees. She notes an onset of left knee pain approximately 1 year ago. She denies any injury or trauma. She notes generalized intermittent pain in the left knee and also states the location of her pain can change. She denies any swelling or stiffness. She does note occasional locking, catching and giving way in the knee. Pain is not associated with any particular activity but she does note increased pain with prolonged standing walking and increased activity level. She did see her PCP initially who prescribed meloxicam which provided no significant improvement. She has been taking over-the-counter anti-inflammatories with some improvement. She also notes an onset of right knee pain a few months ago. She denies any injury or trauma with regards to the right knee. She notes intermittent generalized pain in the right knee as well. No swelling or stiffness. She denies any significant catching, locking or giving way of the right knee. No numbness or tingling in the bilateral lower extremities.   No fevers or chills. Physical Exam     BP 98/62   Ht 5' 4" (1.626 m)   Wt 55.3 kg (122 lb)   LMP 06/12/2023 (Approximate)   BMI 20.94 kg/m²     Bilateral knees: No gross deformity. Skin intact. No erythema ecchymosis or swelling. No effusion. Significant tenderness along the bilateral medial joint lines. No lateral joint line tenderness. No significant tenderness bilateral patellar tendons, and posterior knee. Full range of motion without pain or crepitation. Stable to varus neck stress without pain. Stable Lachman test.  Negative posterior drawer test.  Negative Lee Ann's test.  Positive patellar grind test on the left. Eyes: Anicteric sclerae. ENT: Trachea midline. Lungs: Normal respiratory effort. CV: Capillary refill is less than 2 seconds. Skin: Intact without erythema. Lymph: No palpable lymphadenopathy. Neuro: Sensation is grossly intact to light touch. Psych: Mood and affect are appropriate. Data Review     I have personally reviewed pertinent films in PACS, and my interpretation follows:    X-rays bilateral knees 9/22/2023: No acute osseous abnormality. No fracture or dislocation. No significant degenerative changes.     Past Medical History:   Diagnosis Date    Abnormal Pap smear of cervix     Bronchitis     Endometriosis determined by laparoscopy 8/23/2023    History of transfusion 02/14/2013    Migraine     Pregnant 02/14/2013    vaginal delivery       Past Surgical History:   Procedure Laterality Date    [place] HYSTEROSCOPY BX ENDOMETRIUM&/POLYPC W/WO D&C N/A 8/9/2023    Procedure: (D&C) W/ HYSTEROSCOPY;  Surgeon: Saroj Cristobal DO;  [facility]: [facility];  Service: Gynecology    NM HYSTEROSCOPY ENDOMETRIAL ABLATION N/A 8/9/2023    Procedure: ABLATION ENDOMETRIAL IRMA;  Surgeon: Saroj Cristobal DO;  [facility];  Service: Gynecology    NM LAPAROSCOPY W/RMVL ADNEXAL STRUCTURES Bilateral 8/9/2023    Procedure: SALPINGECTOMY, LAP;  Surgeon: Saroj Cristobal DO;  [facility];  Service: Gynecology    [place] LAPS ABD PRTM&OMENTUM DX W/WO SPEC BR/WA SPX N/A 8/9/2023    Procedure: LAPAROSCOPY DIAGNOSTIC;  Surgeon: Shameka Barber DO;  Location: [facility];  Service: Gynecology    WISDOM TOOTH EXTRACTION         No Known Allergies    Current Outpatient Medications on File Prior to Visit   Medication Sig Dispense Refill    ASHWAGANDHA PO Take 450 mg by mouth 2 (two) times a day      CALCIUM PO Take by mouth      Cholecalciferol (VITAMIN D3) 10 MCG (400 UNIT) CAPS Take by mouth      Echinacea 400 MG CAPS Take 1 capsule by mouth 2 (two) times a day      Multiple Vitamin (MULTIVITAMIN) tablet Take 1 tablet by mouth daily      Omega-3 Fatty Acids (FISH OIL PO) Take by mouth      Probiotic Product (PROBIOTIC PO) Take by mouth      VITAMIN E PO Take by mouth      ibuprofen (MOTRIN) 200 mg tablet Take 200 mg by mouth once (Patient not taking: Reported on 10/31/2023)      ibuprofen (MOTRIN) 600 mg tablet Take 1 tablet (600 mg total) by mouth every 6 (six) hours as needed for mild pain (Patient not taking: Reported on 10/31/2023) 30 tablet 1    meloxicam (MOBIC) 15 mg tablet Take 1 tablet (15 mg total) by mouth daily (Patient not taking: Reported on 10/31/2023) 30 tablet 0    Tri-Lo-Lizette 0.18/0.215/0.25 MG-25 MCG per tablet Take 1 tablet by mouth daily (Patient not taking: Reported on 9/21/2023) 84 tablet 3    triamcinolone (KENALOG) 0.1 % cream Apply topically 2 (two) times a day (Patient not taking: Reported on 10/31/2023) 30 g 0     No current facility-administered medications on file prior to visit.        Social History     Tobacco Use    Smoking status: Former    Smokeless tobacco: Former   Vaping Use    Vaping Use: Never used   Substance Use Topics    Alcohol use: Not Currently     Comment: social    Drug use: No       Family History   Problem Relation Age of Onset    No Known Problems Mother     No Known Problems Father     No Known Problems Paternal Uncle     No Known Problems Sister     No Known Problems Daughter     No Known Problems Maternal Grandmother     No Known Problems Maternal Grandfather     No Known Problems Paternal Grandmother     No Known Problems Paternal Grandfather     No Known Problems Maternal Aunt     No Known Problems Maternal Aunt     No Known Problems Maternal Aunt     No Known Problems Maternal Aunt     Leukemia Paternal Aunt     No Known Problems Paternal Aunt        Review of Systems     As stated in the HPI. All other systems reviewed and are negative.

## 2023-11-14 ENCOUNTER — EVALUATION (OUTPATIENT)
Dept: PHYSICAL THERAPY | Facility: REHABILITATION | Age: 48
End: 2023-11-14
Payer: COMMERCIAL

## 2023-11-14 DIAGNOSIS — G89.29 CHRONIC PAIN OF BOTH KNEES: ICD-10-CM

## 2023-11-14 DIAGNOSIS — M25.561 CHRONIC PAIN OF BOTH KNEES: ICD-10-CM

## 2023-11-14 DIAGNOSIS — M25.562 CHRONIC PAIN OF BOTH KNEES: ICD-10-CM

## 2023-11-14 PROCEDURE — 97110 THERAPEUTIC EXERCISES: CPT

## 2023-11-14 PROCEDURE — 97161 PT EVAL LOW COMPLEX 20 MIN: CPT

## 2023-11-14 NOTE — PROGRESS NOTES
PT Evaluation     Today's date: 2023  Patient name: Griselda Santa  : 1975  MRN: [id number]  Referring provider: Angelina George*  Dx:   Encounter Diagnosis     ICD-10-CM    1. Chronic pain of both knees  M25.561 Ambulatory Referral to Physical Therapy    M25.562     G89.29                      Assessment  Assessment details: Pt, Griselda Santa , is a 57 y.o.  presenting to physical therapy on this date for an initial evaluation with reports of chronic intermittent left knee pain. Upon eval on this date, pt presented with [facility] b/l knee and hip ROM, decreased hamstring, glute, and hip abductor strength, and overall impaired tolerance to functional activities. Pt was provided with initial HEP targeting hamstring and gastroc strengthening as well as bridges to improve stability. At this time, pt would benefit from skilled OP PT to work on deficits listed above and improve overall functional mobility with decreased reports of pain and compensation patterns. POC was discussed with pt, pt verbalized understanding and is in agreement.  Thank you for this referral.  Impairments: abnormal muscle firing, abnormal muscle tone, abnormal or restricted ROM, abnormal movement, activity intolerance, impaired physical strength, lacks appropriate home exercise program, pain with function, poor posture  and poor body mechanics    Goals  STG:   Pt will report 25% decrease in pain in 2 weeks with vacuuming to demonstrate improved tolerance to functional activities   Pt will demonstrate 25% improvements in ROM in 4 weeks   Pt will be I with initial HEP to progress towards independence with exercise     LTG:   Pt will be able to complete ADLs without the need for external support from her brace by d/c   Pt will be able to vacuum without pain by d/c   Pt will improve BLE hip abd, ext, and glute max strength to 5/5 by d/c for increased ease and safety with functional mobility and activities   Pt will be I with modified/updated HEP and demonstrate ability to complete with confidence and proper mechanics/form to safely be d/c from skilled OP PT and continue with exercises on their own        Plan  Patient would benefit from: PT eval and skilled physical therapy  Planned modality interventions: cryotherapy and thermotherapy: hydrocollator packs  Planned therapy interventions: body mechanics training, functional ROM exercises, home exercise program, therapeutic exercise, therapeutic activities, stretching, strengthening, postural training, patient education, neuromuscular re-education, motor coordination training, manual therapy, joint mobilization, nerve gliding and balance  Frequency: 1x week  Duration in visits: 6  Duration in weeks: 6  Treatment plan discussed with: patient        Subjective Evaluation    History of Present Illness  Mechanism of injury: Pt reports that her L knee has been bothering her more than her R, with the L bothering her for more than a year inconsistently. Pt reports that her pain has been more consistent recently. Pt reports that her intensity and location of pain also fluctuates. She reports that she knows when she will be walking a lot she wears a compression brace and does not notice pain afterwards. Pt reports that she was lifting bricks this past weekend and had her knee brace on and was okay but yesterday she vacuumed and was in more pain. Pt reports that her pain location will also vary depending on what she does and is not in a consistent location. Pt reports that occasionally her pain will go up her leg at least once a week and that is a more recent up the lateral aspect of her thigh. Pt denies any n/t in her legs. Pt reports that she is not good at treating her pain and just hopes that it goes away but will occasionally use ice/heat. She reports that she as on meloxicam but she doesn't think it helped much. Pt reports that she has no hx of injuries to her knees.  Pt reports that her knee does bother her when she sleeps at night and she has to sleep with something between her knees with her knees staggered. Pt denies n/t in her knees. Pt denies crunching, clicking, or catching in her knee or crepitus. Patient Goals  Patient goals for therapy: decreased pain, improved balance, increased motion, increased strength, independence with ADLs/IADLs and return to sport/leisure activities  Patient goal: know my knee is okay, be able to be active without limitations  Pain  Current pain ratin  At worst pain ratin  Quality: tight, discomfort, dull ache and knife-like  Alleviating factors: knee brace. Aggravating factors: walking, standing and stair climbing    Social Support  Steps to enter house: yes  Stairs in house: yes   Lives in: multiple-level home    Employment status: working  Treatments  No previous or current treatments        Objective     Tenderness   Left Knee   Tenderness in the patellar tendon, popliteal fossa and quadriceps tendon. No tenderness in the lateral joint line and medial joint line. Right Knee   Tenderness in the popliteal fossa. No tenderness in the lateral joint line, medial joint line, patellar tendon and quadriceps tendon.      Neurological Testing     Sensation     Knee   Left Knee   Intact: Light touch    Right Knee   Intact: light touch     Active Range of Motion   Left Hip   Flexion: WFL    Right Hip   Flexion: WFL  Left Knee   Flexion: WFL  Extension: WFL    Right Knee   Flexion: WFL  Extension: [facility]    Mobility   Patellar Mobility:   Left Knee   Hypomobile: left medial, left lateral, left superior and left inferior    Right Knee   WFL: superior and inferior  Hypomobile: medial and lateral     Strength/Myotome Testing     Left Hip   Planes of Motion   Flexion: 5  Extension: 3+  Abduction: 4    Isolated Muscles   Gluteus nikolay: 3+    Right Hip   Planes of Motion   Flexion: 5  Extension: 3+  Abduction: 4+    Isolated Muscles   Gluteus maximums: 3+    Left Knee Flexion: 4+  Extension: 4+    Right Knee   Flexion: 5  Extension: 5    Tests     Left Knee   Positive patella-femoral grind. Negative anterior drawer, posterior drawer, valgus stress test at 0 degrees, valgus stress test at 30 degrees, varus stress test at 0 degrees and varus stress test at 30 degrees. Right Knee   Positive patella-femoral grind. Negative anterior drawer, posterior drawer, valgus stress test at 0 degrees, valgus stress test at 30 degrees, varus stress test at 0 degrees and varus stress test at 30 degrees. Precautions: N/A     11/14            FOTO IE             IE/RE IE                Manuals                                                                 Neuro Re-Ed             SLS              Tandem stance              Mini squats on bosu              Side stepping              Halos             Airplanes              Steam boats              Ther Ex             Bike              Bridges  demo            Stand gastroc stretch demo            Supine 90/90 HS stretch demo            Leg press              SA flex             SA ext                                                     Modalities                                           Access Code: [email or web address]  URL: [email or web address]  Date: 11/14/2023  Prepared by: Mecca Brunson    Exercises  - Supine Bridge  - 1 x daily - 7 x weekly - 10 reps - 10 hold  - Standing Gastroc Stretch  - 2 x daily - 7 x weekly - 4 reps - 20 hold  - Supine Hamstring Stretch  - 2 x daily - 7 x weekly - 4 reps - 20 hold

## 2023-11-15 ENCOUNTER — OFFICE VISIT (OUTPATIENT)
Dept: GYNECOLOGY | Facility: CLINIC | Age: 48
End: 2023-11-15

## 2023-11-15 VITALS — DIASTOLIC BLOOD PRESSURE: 72 MMHG | BODY MASS INDEX: 22.69 KG/M2 | SYSTOLIC BLOOD PRESSURE: 102 MMHG | WEIGHT: 132.2 LBS

## 2023-11-15 DIAGNOSIS — Z98.890 STATUS POST ENDOMETRIAL ABLATION: ICD-10-CM

## 2023-11-15 DIAGNOSIS — Z90.79 STATUS POST BILATERAL SALPINGECTOMY: ICD-10-CM

## 2023-11-15 DIAGNOSIS — N80.9 ENDOMETRIOSIS DETERMINED BY LAPAROSCOPY: ICD-10-CM

## 2023-11-15 NOTE — PROGRESS NOTES
Assessment/Plan:    Diagnoses and all orders for this visit:    Status post endometrial ablation    Endometriosis determined by laparoscopy    Status post bilateral salpingectomy    Diminished libido      Subjective: Here for follow-up     Patient ID: Chela Puga is a 57 y.o. female. HPI  53-year-old female  1 para 1 status post D&C hysteroscopy and Olimpia endometrial ablation in August of this past year. Patient states she no longer has regular menstrual cycles. She states she does occasionally spot for maybe 1 or 2 days monthly. Denies any further pelvic pain symptoms related to her endometriosis. She is not currently sexually active, has little desire, diminished libido and delayed orgasm. She does stay physically active and enjoys many social activities with her daughter. She will return in January for annual exam and follow-up care. All questions answered. Review of Systems   Respiratory: Negative. Cardiovascular: Negative. Gastrointestinal: Negative. Genitourinary: Negative. Neurological: Negative. Psychiatric/Behavioral: Negative. All other systems reviewed and are negative. Objective: No acute distress  /72 (BP Location: Left arm, Patient Position: Sitting, Cuff Size: Standard)   Wt 60 kg (132 lb 3.2 oz)   LMP 2023 (Approximate)   BMI 22.69 kg/m²      Physical Exam  Vitals and nursing note reviewed. Constitutional:       Appearance: Normal appearance. She is normal weight. Eyes:      Extraocular Movements: Extraocular movements intact. Pupils: Pupils are equal, round, and reactive to light. Pulmonary:      Effort: Pulmonary effort is normal.   Genitourinary:     Comments: Pelvic exam deferred  Musculoskeletal:      Cervical back: Normal range of motion. Neurological:      Mental Status: She is alert and oriented to person, place, and time.    Psychiatric:         Mood and Affect: Mood normal.         Behavior: Behavior normal. Thought Content: Thought content normal.         Judgment: Judgment normal.        Please note    In addition to the time spent discussing the findings and results of today's visit and exam, I spent approximately 20 minutes of face-to-face time with the patient, greater than 50% of which was spent in counseling and coordination of care for this patient.

## 2023-11-17 ENCOUNTER — OFFICE VISIT (OUTPATIENT)
Dept: PHYSICAL THERAPY | Facility: REHABILITATION | Age: 48
End: 2023-11-17
Payer: COMMERCIAL

## 2023-11-17 DIAGNOSIS — M25.561 CHRONIC PAIN OF BOTH KNEES: Primary | ICD-10-CM

## 2023-11-17 DIAGNOSIS — M25.562 CHRONIC PAIN OF BOTH KNEES: Primary | ICD-10-CM

## 2023-11-17 DIAGNOSIS — G89.29 CHRONIC PAIN OF BOTH KNEES: Primary | ICD-10-CM

## 2023-11-17 PROCEDURE — 97112 NEUROMUSCULAR REEDUCATION: CPT

## 2023-11-17 PROCEDURE — 97110 THERAPEUTIC EXERCISES: CPT

## 2023-11-17 NOTE — PROGRESS NOTES
Daily Note     Today's date: 2023  Patient name: Faisal Flores  : 1975  MRN: [id number]  Referring provider: Beatriz Paiz*  Dx:   Encounter Diagnosis     ICD-10-CM    1. Chronic pain of both knees  M25.561     M25.562     G89.29                      Subjective: Pt reports that she woke up with a migraine this morning so she hasn't done her exercises yet. She reports that she did do them over the past few days and feels very shaky with the bridges but the stretches make her feel momentarily good. Objective: See treatment diary below      Assessment: Pt tolerated treatment well. Pt did well on her first follow up since her IE on this date. Pt was provided with updated HEP to include SLS, tandem stance, side stepping, and decrease from 10" hold to 5" hold for bridges at home. HEP was reviewed with patient and all questions were answered. Patient demonstrated fatigue post treatment, exhibited good technique with therapeutic exercises, and would benefit from continued PT. Plan: Continue per plan of care. Progress treatment as tolerated. Precautions: N/A                FOTO IE             IE/RE IE                Manuals                                                                 Neuro Re-Ed             SLS   1x30" ea            Tandem stance   2x30"            Mini squats on bosu              Side stepping   Pink TB x3 laps           Halos             Airplanes              Steam boats              Ther Ex             Bike   X5 min            Bridges  demo 5"x10            Stand gastroc stretch demo            Supine 90/90 HS stretch demo            Leg press   44# x10           SA flex  33# x10           SA ext   22# x10                                                   Modalities                                           Access Code: [email or web address]  URL: [email or web address]  Date: 2023  Prepared by: Akbar Denis    Exercises  - Supine Bridge  - 1 x daily - 7 x weekly - 10 reps - 10 hold  - Standing Gastroc Stretch  - 2 x daily - 7 x weekly - 4 reps - 20 hold  - Supine Hamstring Stretch  - 2 x daily - 7 x weekly - 4 reps - 20 hold

## 2023-11-28 ENCOUNTER — OFFICE VISIT (OUTPATIENT)
Dept: PHYSICAL THERAPY | Facility: REHABILITATION | Age: 48
End: 2023-11-28
Payer: COMMERCIAL

## 2023-11-28 DIAGNOSIS — G89.29 CHRONIC PAIN OF BOTH KNEES: Primary | ICD-10-CM

## 2023-11-28 DIAGNOSIS — M25.562 CHRONIC PAIN OF BOTH KNEES: Primary | ICD-10-CM

## 2023-11-28 DIAGNOSIS — M25.561 CHRONIC PAIN OF BOTH KNEES: Primary | ICD-10-CM

## 2023-11-28 PROCEDURE — 97110 THERAPEUTIC EXERCISES: CPT

## 2023-11-28 PROCEDURE — 97112 NEUROMUSCULAR REEDUCATION: CPT

## 2023-11-28 NOTE — PROGRESS NOTES
Daily Note     Today's date: 2023  Patient name: Tono Winston  : 1975  MRN: [id number]  Referring provider: Abisai Sanford*  Dx:   Encounter Diagnosis     ICD-10-CM    1. Chronic pain of both knees  M25.561     M25.562     G89.29                      Subjective:  Patient reports that her knees including her L have been feeling really good until she walked on the treadmill today and then her L knee started to hurt again after her walk. Objective: See treatment diary below      Assessment: Patient tolerated treatment well. Patient performed ex as noted. Patient presented with increased reports of L knee pain today after walking for several hours during her work shift. Discussed options for a walking schedule during work (suggested decreasing to 2 hour treadmill walk/2 hour desk work alt through her work shift. Patient will assess benefit to changing her daily activity. Patient tolerated ex performed well today. Patient would benefit from continued PT intervention to address deficits and attain set goals. Plan: Continue per plan of care.       Precautions: N/A               FOTO IE             IE/RE IE                Manuals                                                                 Neuro Re-Ed             SLS   1x30" ea  30"x2 ea          Tandem stance   2x30"  30"x2 ea          Mini squats on bosu              Side stepping   Pink TB x3 laps Pink tb  X4 laps          Halos             Airplanes              Steam boats              Ther Ex             Bike   X5 min  X5 min          Bridges  demo 5"x10            Stand gastroc stretch demo            Supine 90/90 HS stretch demo            Leg press   44# x10 44#  2x10          SA flex  33# x10 33#  2x10          SA ext   22# x10  22#  2x10                                                 Modalities                                           Access Code: [email or web address]  URL: [email or web address]  Date: 11/14/2023  Prepared by: Mecca Brunson    Exercises  - Supine Bridge  - 1 x daily - 7 x weekly - 10 reps - 10 hold  - Standing Gastroc Stretch  - 2 x daily - 7 x weekly - 4 reps - 20 hold  - Supine Hamstring Stretch  - 2 x daily - 7 x weekly - 4 reps - 20 hold

## 2023-12-04 ENCOUNTER — HOSPITAL ENCOUNTER (OUTPATIENT)
Dept: RADIOLOGY | Facility: IMAGING CENTER | Age: 48
Discharge: HOME | End: 2023-12-04
Payer: COMMERCIAL

## 2023-12-04 VITALS — BODY MASS INDEX: 23.9 KG/M2 | HEIGHT: 64 IN | WEIGHT: 140 LBS

## 2023-12-04 DIAGNOSIS — Z12.31 ENCOUNTER FOR SCREENING MAMMOGRAM FOR BREAST CANCER: ICD-10-CM

## 2023-12-04 PROCEDURE — 77063 BREAST TOMOSYNTHESIS BI: CPT

## 2023-12-04 PROCEDURE — 77067 SCR MAMMO BI INCL CAD: CPT

## 2023-12-05 ENCOUNTER — OFFICE VISIT (OUTPATIENT)
Dept: PHYSICAL THERAPY | Facility: REHABILITATION | Age: 48
End: 2023-12-05
Payer: COMMERCIAL

## 2023-12-05 DIAGNOSIS — M25.561 CHRONIC PAIN OF BOTH KNEES: Primary | ICD-10-CM

## 2023-12-05 DIAGNOSIS — M25.562 CHRONIC PAIN OF BOTH KNEES: Primary | ICD-10-CM

## 2023-12-05 DIAGNOSIS — G89.29 CHRONIC PAIN OF BOTH KNEES: Primary | ICD-10-CM

## 2023-12-05 PROCEDURE — 97112 NEUROMUSCULAR REEDUCATION: CPT

## 2023-12-05 PROCEDURE — 97110 THERAPEUTIC EXERCISES: CPT

## 2023-12-05 PROCEDURE — 97140 MANUAL THERAPY 1/> REGIONS: CPT

## 2023-12-05 NOTE — PROGRESS NOTES
Daily Note     Today's date: 2023  Patient name: Kia Arrieta  : 1975  MRN: [id number]  Referring provider: Lio Sultana*  Dx:   Encounter Diagnosis     ICD-10-CM    1. Chronic pain of both knees  M25.561     M25.562     G89.29                      Subjective: Pt reports that up until today her knee has been feeling good but today her pain is back and it's the same pain as she had before but not as bad. She reports that she did do most of her exercises this morning and she feels like it may have made it worse. Objective: See treatment diary below      Assessment: Pt tolerated treatment well. Pt reported decrease in knee pain post-manuals on this date. Pt was able to complete weight machines on this date without an increase in pain and by the end of today's session patient reported that she felt better compared to when she first arrived. Patient demonstrated fatigue post treatment, exhibited good technique with therapeutic exercises, and would benefit from continued PT. Plan: Continue per plan of care. Progress treatment as tolerated.        Precautions: N/A              FOTO IE             IE/RE IE                Manuals                 Patellar mobility ML             Lateral knee, quad STM ML                                   Neuro Re-Ed             SLS   1x30" ea  30"x2 ea HEP         Tandem stance   2x30"  30"x2 ea HEP          Mini squats on bosu              Side stepping   Pink TB x3 laps Pink tb  X4 laps          Halos             Airplanes              Steam boats              Ther Ex             Bike   X5 min  X5 min X5 min          Bridges  demo 5"x10            Stand gastroc stretch demo            Supine 90/90 HS stretch demo            Leg press   44# x10 44#  2x10 44# 2x10          SA flex  33# x10 33#  2x10 33# 2x10          SA ext   22# x10  22#  2x10 22#  2x10                                                 Modalities Access Code: [email or web address]  URL: [email or web address]  Date: 11/14/2023  Prepared by: Connie De Souza    Exercises  - Supine Bridge  - 1 x daily - 7 x weekly - 10 reps - 10 hold  - Standing Gastroc Stretch  - 2 x daily - 7 x weekly - 4 reps - 20 hold  - Supine Hamstring Stretch  - 2 x daily - 7 x weekly - 4 reps - 20 hold

## 2023-12-06 DIAGNOSIS — R92.8 ABNORMALITY OF LEFT BREAST ON SCREENING MAMMOGRAM: Primary | ICD-10-CM

## 2023-12-08 ENCOUNTER — TELEPHONE (OUTPATIENT)
Dept: OBGYN CLINIC | Facility: CLINIC | Age: 48
End: 2023-12-08

## 2023-12-15 ENCOUNTER — OFFICE VISIT (OUTPATIENT)
Dept: OBGYN CLINIC | Facility: CLINIC | Age: 48
End: 2023-12-15

## 2023-12-15 VITALS
WEIGHT: 134 LBS | DIASTOLIC BLOOD PRESSURE: 80 MMHG | HEIGHT: 64 IN | BODY MASS INDEX: 22.88 KG/M2 | SYSTOLIC BLOOD PRESSURE: 110 MMHG

## 2023-12-15 DIAGNOSIS — M22.2X2 PATELLOFEMORAL SYNDROME OF BOTH KNEES: ICD-10-CM

## 2023-12-15 DIAGNOSIS — M22.2X1 PATELLOFEMORAL SYNDROME OF BOTH KNEES: ICD-10-CM

## 2023-12-15 DIAGNOSIS — M65.311 TRIGGER THUMB OF RIGHT HAND: Primary | ICD-10-CM

## 2023-12-15 NOTE — PROGRESS NOTES
Patient Name:  Kia Arrieta  MRN:  [id number]    Assessment & Plan     Bilateral patellofemoral dysfunction. Right trigger thumb. Patient notes 85% improvement in her bilateral knee pain with physical therapy and anti-inflammatories. She is extremely happy with her result thus far. MRI is not indicated at this time. Continue home exercises and activities as tolerated with modification to avoid pain. She also notes significant improvement after undergoing right trigger thumb injection on 11/3/2023. At this time she may monitor her symptoms. Advised a repeat injection may be performed if needed 3 months from the original injection. Follow-up as needed    Chief Complaint     Follow-up bilateral knee pain, right thumb pain. History of the Present Illness     Kia Arrieta is a 57 y.o. female who returns to the office today for follow-up regarding her bilateral knees. She was last seen on 11/6/2023. At that time she was referred to physical therapy and recommended she take anti-inflammatories as needed. She returns to the office today noting 85% improvement of her symptoms. She has been participating in formal physical therapy and performing home exercises as well. She no longer notes any significant knee pain. She denies any swelling, stiffness, weakness, or instability. She has returned to performing many activities without pain or difficulty and no longer requires the use of her brace. She is extremely happy with her result. Patient did undergo go a right trigger thumb injection on 11/3/2023 and noted significant improvement after this as well. She still notes occasional soreness in the thumb but denies any mechanical signs. She denies any numbness and tingling. No fevers or chills. Physical Exam     /80   Ht 5' 4" (1.626 m)   Wt 60.8 kg (134 lb)   LMP 06/12/2023 (Approximate)   BMI 23.00 kg/m²     Right thumb: No gross deformity. Skin intact.   No erythema ecchymosis or swelling. No significant tenderness or palpable nodule at the A1 pulley of the thumb. Full thumb range of motion without significant clicking popping evident. No triggering or locking as well. Sensation intact distally. Brisk capillary refill. Bilateral knees: No gross deformity. Skin intact. No erythema ecchymosis or swelling. No effusion. No significant joint line tenderness. Full range of motion without pain. Stable to varus and valgus stress without pain. Stable Lachman test.  Negative posterior drawer test.  Negative Lee Ann's test.  Negative patellar grind test.  Extensor mechanism intact. Eyes: Anicteric sclerae. ENT: Trachea midline. Lungs: Normal respiratory effort. CV: Capillary refill is less than 2 seconds. Skin: Intact without erythema. Lymph: No palpable lymphadenopathy. Neuro: Sensation is grossly intact to light touch. Psych: Mood and affect are appropriate.       Past Medical History:   Diagnosis Date    Abnormal Pap smear of cervix     Bronchitis     Endometriosis determined by laparoscopy 8/23/2023    History of transfusion 02/14/2013    Migraine     Pregnant 02/14/2013    vaginal delivery       Past Surgical History:   Procedure Laterality Date    CO HYSTEROSCOPY BX ENDOMETRIUM&/POLYPC W/WO D&C N/A 8/9/2023    Procedure: (D&C) W/ HYSTEROSCOPY;  Surgeon: Nikki Ho DO;  Location: [facility];  Service: Gynecology    CO HYSTEROSCOPY ENDOMETRIAL ABLATION N/A 8/9/2023    Procedure: ABLATION ENDOMETRIAL IRMA;  Surgeon: Nikki Ho DO;  Location: [facility];  Service: Gynecology    CO LAPAROSCOPY W/RMVL ADNEXAL STRUCTURES Bilateral 8/9/2023    Procedure: SALPINGECTOMY, LAP;  Surgeon: Nikki Ho DO;  Location: [facility];  Service: Gynecology    CO LAPS ABD PRTM&OMENTUM DX W/WO SPEC BR/WA [facility] N/A 8/9/2023    Procedure: LAPAROSCOPY DIAGNOSTIC;  Surgeon: Nikki Ho DO;  Location: [facility];  Service: Gynecology    WISDOM TOOTH EXTRACTION         No Known Allergies    Current Outpatient Medications on File Prior to Visit   Medication Sig Dispense Refill    ASHWAGANDHA PO Take 450 mg by mouth 2 (two) times a day      CALCIUM PO Take by mouth      Cholecalciferol (VITAMIN D3) 10 MCG (400 UNIT) CAPS Take by mouth      Echinacea 400 MG CAPS Take 1 capsule by mouth 2 (two) times a day      Multiple Vitamin (MULTIVITAMIN) tablet Take 1 tablet by mouth daily      Omega-3 Fatty Acids (FISH OIL PO) Take by mouth      Probiotic Product (PROBIOTIC PO) Take by mouth      VITAMIN E PO Take by mouth       No current facility-administered medications on file prior to visit. Social History     Tobacco Use    Smoking status: Former    Smokeless tobacco: Former   Vaping Use    Vaping status: Never Used   Substance Use Topics    Alcohol use: Not Currently     Comment: social    Drug use: No       Family History   Problem Relation Age of Onset    No Known Problems Mother     No Known Problems Father     No Known Problems Sister     No Known Problems Daughter     No Known Problems Maternal Grandmother     No Known Problems Maternal Grandfather     No Known Problems Paternal Grandmother     No Known Problems Paternal Grandfather     No Known Problems Maternal Aunt     Cancer Maternal Aunt     No Known Problems Maternal Aunt     No Known Problems Maternal Aunt     Leukemia Paternal Aunt     No Known Problems Paternal Aunt     No Known Problems Paternal Uncle        Review of Systems     As stated in the HPI. All other systems reviewed and are negative.

## 2023-12-18 ENCOUNTER — OFFICE VISIT (OUTPATIENT)
Dept: PHYSICAL THERAPY | Facility: REHABILITATION | Age: 48
End: 2023-12-18
Payer: COMMERCIAL

## 2023-12-18 DIAGNOSIS — M25.562 CHRONIC PAIN OF BOTH KNEES: Primary | ICD-10-CM

## 2023-12-18 DIAGNOSIS — G89.29 CHRONIC PAIN OF BOTH KNEES: Primary | ICD-10-CM

## 2023-12-18 DIAGNOSIS — M25.561 CHRONIC PAIN OF BOTH KNEES: Primary | ICD-10-CM

## 2023-12-18 PROCEDURE — 97164 PT RE-EVAL EST PLAN CARE: CPT

## 2023-12-18 PROCEDURE — 97535 SELF CARE MNGMENT TRAINING: CPT

## 2023-12-18 NOTE — PROGRESS NOTES
PT Re-evaluation    Today's date: 2023  Patient name: Tayo Sorto  : 1975  MRN: [id number]  Referring provider: Lucien Gallagher P*  Dx:   Encounter Diagnosis     ICD-10-CM    1. Chronic pain of both knees  M25.561     M25.562     G89.29                    Assessment  Assessment details:   Upon RE on this date, pt presents with improve LE ROM, improved BLE strength, decreased pain, and improved overall tolerance to functional activities with reports of no longer using her brace for ADLs or community activities. At this time, pt has demonstrated compliance with her HEP and also demonstrates the ability to manage her own symptoms with her HEP.  HEP was reviewed with patient and all questions were answered. At this time, pt will be d/c to HEP and was educated to contact PT office with any questions or concerns, pt verbalized understanding.     Impairments: abnormal muscle firing, abnormal muscle tone, abnormal or restricted ROM, abnormal movement, activity intolerance, impaired physical strength, lacks appropriate home exercise program, pain with function, poor posture  and poor body mechanics     Goals  STG:   Pt will report 25% decrease in pain in 2 weeks with vacuuming to demonstrate improved tolerance to functional activities MET   Pt will demonstrate 25% improvements in ROM in 4 weeks MET   Pt will be I with initial HEP to progress towards independence with exercise MET      LTG:   Pt will be able to complete ADLs without the need for external support from her brace by d/c MET   Pt will be able to vacuum without pain by d/c MET   Pt will improve BLE hip abd, ext, and glute max strength to 5/5 by d/c for increased ease and safety with functional mobility and activities NOT MET   Pt will be I with modified/updated HEP and demonstrate ability to complete with confidence and proper mechanics/form to safely be d/c from skilled OP PT and continue with exercises on their own MET         Plan  Pt d/c  to HEP        Subjective Evaluation     History of Present Illness  Mechanism of injury: Pt reports that her L knee has been bothering her more than her R, with the L bothering her for more than a year inconsistently. Pt reports that her pain has been more consistent recently. Pt reports that her intensity and location of pain also fluctuates. She reports that she knows when she will be walking a lot she wears a compression brace and does not notice pain afterwards. Pt reports that she was lifting bricks this past weekend and had her knee brace on and was okay but yesterday she vacuumed and was in more pain. Pt reports that her pain location will also vary depending on what she does and is not in a consistent location. Pt reports that occasionally her pain will go up her leg at least once a week and that is a more recent up the lateral aspect of her thigh. Pt denies any n/t in her legs. Pt reports that she is not good at treating her pain and just hopes that it goes away but will occasionally use ice/heat. She reports that she as on meloxicam but she doesn't think it helped much. Pt reports that she has no hx of injuries to her knees. Pt reports that her knee does bother her when she sleeps at night and she has to sleep with something between her knees with her knees staggered. Pt denies n/t in her knees. Pt denies crunching, clicking, or catching in her knee or crepitus.     23  Tayo reports that overall she is doing very well and is happy with where she is at. She reports that she followed up with her referring provider as well and he is pleased with where she is at as well.     Patient Goals  Patient goals for therapy: decreased pain, improved balance, increased motion, increased strength, independence with ADLs/IADLs and return to sport/leisure activities  Patient goal: know my knee is okay, be able to be active without limitations    Pain  Current pain ratin  At worst pain ratin  Quality: tight,  discomfort, dull ache and knife-like  Alleviating factors: knee brace.  Aggravating factors: walking, standing and stair climbing    12/28/23  Current: slight in the left   At worst: 2  Aggs: prolonged sitting   Eases: stretches, being mindful      Social Support  Steps to enter house: yes  Stairs in house: yes   Lives in: multiple-level home     Employment status: working  Treatments  No previous or current treatments           Objective      Tenderness   Left Knee   Tenderness in the patellar tendon, popliteal fossa and quadriceps tendon. No tenderness in the lateral joint line and medial joint line.      Right Knee   Tenderness in the popliteal fossa. No tenderness in the lateral joint line, medial joint line, patellar tendon and quadriceps tendon.     12/18/23  Mild TTP popliteal fossa, decreased when compared to IE      Neurological Testing      Sensation      Knee   Left Knee   Intact: Light touch     Right Knee   Intact: light touch      Active Range of Motion   Left Hip   Flexion: WFL     Right Hip   Flexion: WFL  Left Knee   Flexion: WFL  Extension: WFL     Right Knee   Flexion: WFL  Extension: WFL     Mobility   Patellar Mobility:   Left Knee   Hypomobile: left medial, left lateral, left superior and left inferior     Right Knee   WFL: superior and inferior  Hypomobile: medial and lateral     12/18/23  WFL patellar mobility b/l      Strength/Myotome Testing      Left Hip   Planes of Motion   Flexion: 5  Extension: 3+  Abduction: 4     Isolated Muscles   Gluteus nikolay: 3+     Right Hip   Planes of Motion   Flexion: 5  Extension: 3+  Abduction: 4+     Isolated Muscles   Gluteus maximums: 3+     Left Knee   Flexion: 4+  Extension: 4+     Right Knee   Flexion: 5  Extension: 5    12/18/23  Left Hip   Planes of Motion   Flexion: 5  Extension: 4+  Abduction: 4+     Isolated Muscles   Gluteus nikolay: 4+     Right Hip   Planes of Motion   Flexion: 5  Extension: 4+  Abduction: 4+     Isolated Muscles   Gluteus  "maximums: 4+     Left Knee   Flexion: 4+  Extension: 4+     Right Knee   Flexion: 5  Extension: 5            Precautions: N/A     11/14 11/17 11/28 12/5 12/18        FOTO IE     Perf met         IE/RE IE     RE            Manuals                 Patellar mobility ML             Lateral knee, quad STM ML                                   Neuro Re-Ed             SLS   1x30\" ea  30\"x2 ea HEP         Tandem stance   2x30\"  30\"x2 ea HEP          Mini squats on bosu              Side stepping   Pink TB x3 laps Pink tb  X4 laps          Halos             Airplanes              Steam boats              Ther Ex             Bike   X5 min  X5 min X5 min  X5 min         Bridges  demo 5\"x10            Stand gastroc stretch demo            Supine 90/90 HS stretch demo            Leg press   44# x10 44#  2x10 44# 2x10          SA flex  33# x10 33#  2x10 33# 2x10          SA ext   22# x10  22#  2x10 22#  2x10               HEP review and progressions, home symptom management                                   Modalities                                           Access Code: [email or web address]  URL: [email or web address]  Date: 11/14/2023  Prepared by: Sheyla Vallejo    Exercises  - Supine Bridge  - 1 x daily - 7 x weekly - 10 reps - 10 hold  - Standing Gastroc Stretch  - 2 x daily - 7 x weekly - 4 reps - 20 hold  - Supine Hamstring Stretch  - 2 x daily - 7 x weekly - 4 reps - 20 hold                 "

## 2024-01-24 ENCOUNTER — HOSPITAL ENCOUNTER (OUTPATIENT)
Dept: ULTRASOUND IMAGING | Facility: CLINIC | Age: 49
Discharge: HOME | End: 2024-01-24
Payer: COMMERCIAL

## 2024-01-24 ENCOUNTER — HOSPITAL ENCOUNTER (OUTPATIENT)
Dept: MAMMOGRAPHY | Facility: CLINIC | Age: 49
Discharge: HOME | End: 2024-01-24
Payer: COMMERCIAL

## 2024-01-24 DIAGNOSIS — R92.8 ABNORMAL SCREENING MAMMOGRAM: ICD-10-CM

## 2024-01-24 DIAGNOSIS — R92.8 ABNORMALITY OF LEFT BREAST ON SCREENING MAMMOGRAM: Primary | ICD-10-CM

## 2024-01-24 PROCEDURE — 77065 DX MAMMO INCL CAD UNI: CPT

## 2024-01-24 PROCEDURE — G0279 TOMOSYNTHESIS, MAMMO: HCPCS

## 2024-01-24 PROCEDURE — 76642 ULTRASOUND BREAST LIMITED: CPT

## 2024-01-30 ENCOUNTER — ANNUAL EXAM (OUTPATIENT)
Dept: GYNECOLOGY | Facility: CLINIC | Age: 49
End: 2024-01-30
Payer: COMMERCIAL

## 2024-01-30 VITALS
BODY MASS INDEX: 22.71 KG/M2 | WEIGHT: 133 LBS | DIASTOLIC BLOOD PRESSURE: 78 MMHG | SYSTOLIC BLOOD PRESSURE: 118 MMHG | HEIGHT: 64 IN

## 2024-01-30 DIAGNOSIS — R10.2 PELVIC PAIN: ICD-10-CM

## 2024-01-30 DIAGNOSIS — R68.82 DIMINISHED LIBIDO: ICD-10-CM

## 2024-01-30 DIAGNOSIS — Z87.410 HISTORY OF CERVICAL DYSPLASIA: ICD-10-CM

## 2024-01-30 DIAGNOSIS — N80.9 ENDOMETRIOSIS DETERMINED BY LAPAROSCOPY: ICD-10-CM

## 2024-01-30 DIAGNOSIS — Z98.890 STATUS POST ENDOMETRIAL ABLATION: ICD-10-CM

## 2024-01-30 DIAGNOSIS — Z01.419 WOMEN'S ANNUAL ROUTINE GYNECOLOGICAL EXAMINATION: Primary | ICD-10-CM

## 2024-01-30 DIAGNOSIS — Z90.79 STATUS POST BILATERAL SALPINGECTOMY: ICD-10-CM

## 2024-01-30 DIAGNOSIS — R92.8 ABNORMALITY OF LEFT BREAST ON SCREENING MAMMOGRAM: ICD-10-CM

## 2024-01-30 PROCEDURE — G0476 HPV COMBO ASSAY CA SCREEN: HCPCS | Performed by: OBSTETRICS & GYNECOLOGY

## 2024-01-30 PROCEDURE — 99396 PREV VISIT EST AGE 40-64: CPT | Performed by: OBSTETRICS & GYNECOLOGY

## 2024-01-30 PROCEDURE — G0145 SCR C/V CYTO,THINLAYER,RESCR: HCPCS | Performed by: OBSTETRICS & GYNECOLOGY

## 2024-01-30 NOTE — PROGRESS NOTES
"Assessment   Annual well woman exam  History of pelvic endometriosis determined by laparoscopy  History of cervical dysplasia.  Status post bilateral salpingectomy  Status post D&C hysteroscopy with O[place]ia endometrial ablation in .  Dense breast tissue on screening mammogram  Colon cancer screening up-to-date  Negative Cologuard testing in   New onset of pelvic pain        Plan   Follow-up diagnostic mammography ordered, follow-up left breast ultrasound ordered  Negative family history of breast cancer  Pelvic ultrasound ordered, follow-up in the upcoming weeks   All questions answered.  Await pap smear results.  Breast self exam technique reviewed and patient encouraged to perform self-exam monthly.  Discussed healthy lifestyle modifications.     Subjective for annual exam     Tayo Sorto is a 48 y.o. female who presents for annual exam. Periods are rare, lasting 1 days. Dysmenorrhea:none. Cyclic symptoms include breast tenderness and pelvic pain. No intermenstrual bleeding, spotting, or discharge. The patient reports that there is not domestic violence in her life.     Current contraception:  Bilateral salpingectomy  History of abnormal Pap smear: yes -history of cervical dysplasia  Family history of uterine or ovarian cancer: no  Regular self breast exam: yes  History of abnormal mammogram: yes -dense breast tissue, asymmetry left breast, follow-up ordered  Family history of breast cancer: no  History of abnormal lipids: no  Menstrual History:  OB History          2    Para   1    Term   1            AB        Living             SAB        IAB        Ectopic        Multiple        Live Births   1                Menarche age: 14  No LMP recorded. Patient has had an ablation.     Review of Systems  Pertinent items are noted in HPI.      Objective no acute distress   /78 (BP Location: Right arm, Patient Position: Sitting, Cuff Size: Standard)   Ht 5' 4\" (1.626 m)   Wt 60.3 kg (133 " lb)   BMI 22.83 kg/m²     General:   alert and oriented, in no acute distress, alert, appears stated age, and cooperative   Heart: regular rate and rhythm, S1, S2 normal, no murmur, click, rub or gallop   Lungs: clear to auscultation bilaterally   Abdomen: soft, non-tender, without masses or organomegaly   Vulva: normal, Bartholin's, Urethra, [place]'s normal, female escutcheon   Vagina: normal mucosa, normal discharge, no palpable nodules   Cervix: anteverted, multiparous appearance, no bleeding following Pap, no cervical motion tenderness, and no lesions   Uterus: normal size, anteverted, mobile, non-tender, normal shape and consistency   Adnexa: normal adnexa and no mass, fullness, tenderness   Bilateral breast exam in the sitting and supine position with chaperone present, no visible or palpable breast lesions identified.  No breast masses noted.    No supraclavicular or axillary lymphadenopathy noted.  No nipple discharge.   Reviewed self-breast exam techniques.   Rectal exam,  deferred

## 2024-02-03 LAB
LAB AP GYN PRIMARY INTERPRETATION: NORMAL
Lab: NORMAL

## 2024-02-05 ENCOUNTER — OFFICE VISIT (OUTPATIENT)
Dept: OBGYN CLINIC | Facility: CLINIC | Age: 49
End: 2024-02-05
Payer: COMMERCIAL

## 2024-02-05 VITALS
SYSTOLIC BLOOD PRESSURE: 116 MMHG | WEIGHT: 133 LBS | DIASTOLIC BLOOD PRESSURE: 60 MMHG | BODY MASS INDEX: 22.71 KG/M2 | HEIGHT: 64 IN

## 2024-02-05 DIAGNOSIS — M65.311 TRIGGER THUMB OF RIGHT HAND: Primary | ICD-10-CM

## 2024-02-05 PROCEDURE — 20550 NJX 1 TENDON SHEATH/LIGAMENT: CPT | Performed by: PHYSICIAN ASSISTANT

## 2024-02-05 PROCEDURE — 99213 OFFICE O/P EST LOW 20 MIN: CPT | Performed by: PHYSICIAN ASSISTANT

## 2024-02-05 RX ORDER — LIDOCAINE HYDROCHLORIDE 10 MG/ML
1 INJECTION, SOLUTION INFILTRATION; PERINEURAL
Status: COMPLETED | OUTPATIENT
Start: 2024-02-05 | End: 2024-02-05

## 2024-02-05 RX ORDER — BETAMETHASONE SODIUM PHOSPHATE AND BETAMETHASONE ACETATE 3; 3 MG/ML; MG/ML
6 INJECTION, SUSPENSION INTRA-ARTICULAR; INTRALESIONAL; INTRAMUSCULAR; SOFT TISSUE
Status: COMPLETED | OUTPATIENT
Start: 2024-02-05 | End: 2024-02-05

## 2024-02-05 RX ADMIN — LIDOCAINE HYDROCHLORIDE 1 ML: 10 INJECTION, SOLUTION INFILTRATION; PERINEURAL at 14:30

## 2024-02-05 RX ADMIN — BETAMETHASONE SODIUM PHOSPHATE AND BETAMETHASONE ACETATE 6 MG: 3; 3 INJECTION, SUSPENSION INTRA-ARTICULAR; INTRALESIONAL; INTRAMUSCULAR; SOFT TISSUE at 14:30

## 2024-02-05 NOTE — PROGRESS NOTES
"Patient Name:  Tayo Sorto  MRN:  [id number]    Assessment & Plan     Right trigger thumb  Patient was offered and accepted a corticosteroid injection into the right thumb A1 pulley today.  Activities as tolerated.  Follow-up as needed.  If symptoms persist or return recommend evaluation by one of our hand specialist to discuss trigger finger release.    Chief Complaint     Right trigger thumb    History of the Present Illness     Tayo Sorto is a 48 y.o. female who returns to the office today for follow-up regarding her right thumb.  She did undergo a right trigger thumb corticosteroid injection on 11/3/2023.  She noted significant improvement for approximately 1.5 months after undergoing the injection.  Since then she noted a gradual return of her pain.  She does note that her pain is not as severe since her initial presentation.  Pain is localized to the volar base of the thumb and worse with repetitive use and movement.  She denies any clicking popping locking or triggering.  No numbness or tingling.  No fevers or chills.    Physical Exam     /60   Ht 5' 4\" (1.626 m)   Wt 60.3 kg (133 lb)   BMI 22.83 kg/m²     Right thumb: No gross deformity.  Skin intact.  No erythema ecchymosis or swelling.  There is a palpable nodule at the A1 pulley which is tender to palpation.  Thumb range of motion is intact and full with occasional clicking.  No locking or triggering.  Sensation is intact distally.  Brisk capillary refill.    Eyes: Anicteric sclerae.  ENT: Trachea midline.  Lungs: Normal respiratory effort.  CV: Capillary refill is less than 2 seconds.  Skin: Intact without erythema.  Lymph: No palpable lymphadenopathy.  Neuro: Sensation is grossly intact to light touch.  Psych: Mood and affect are appropriate.    Past Medical History:   Diagnosis Date    Abnormal Pap smear of cervix     Bronchitis     Endometriosis determined by laparoscopy 8/23/2023    History of transfusion 02/14/2013    Migraine     " Pregnant 02/14/2013    vaginal delivery       Past Surgical History:   Procedure Laterality Date    GA HYSTEROSCOPY BX ENDOMETRIUM&/POLYPC W/WO D&C N/A 8/9/2023    Procedure: (D&C) W/ HYSTEROSCOPY;  Surgeon: C William Riedel, DO;  Location: [facility];  Service: Gynecology    GA HYSTEROSCOPY ENDOMETRIAL ABLATION N/A 8/9/2023    Procedure: ABLATION ENDOMETRIAL IRMA;  Surgeon: C William Riedel, DO;  Location: [facility];  Service: Gynecology    GA LAPAROSCOPY W/RMVL ADNEXAL STRUCTURES Bilateral 8/9/2023    Procedure: SALPINGECTOMY, LAP;  Surgeon: C William Riedel, DO;  Location: [facility];  Service: Gynecology    GA LAPS ABD PRTM&OMENTUM DX W/WO SPEC BR/WA SPX N/A 8/9/2023    Procedure: LAPAROSCOPY DIAGNOSTIC;  Surgeon: C William Riedel, DO;  Location: [facility];  Service: Gynecology    WISDOM TOOTH EXTRACTION         No Known Allergies    Current Outpatient Medications on File Prior to Visit   Medication Sig Dispense Refill    ASHWAGANDHA PO Take 450 mg by mouth 2 (two) times a day      CALCIUM PO Take by mouth      Cholecalciferol (VITAMIN D3) 10 MCG (400 UNIT) CAPS Take by mouth      Echinacea 400 MG CAPS Take 1 capsule by mouth 2 (two) times a day      Multiple Vitamin (MULTIVITAMIN) tablet Take 1 tablet by mouth daily      Omega-3 Fatty Acids (FISH OIL PO) Take by mouth      Probiotic Product (PROBIOTIC PO) Take by mouth      VITAMIN E PO Take by mouth       No current facility-administered medications on file prior to visit.       Social History     Tobacco Use    Smoking status: Former    Smokeless tobacco: Former   Vaping Use    Vaping status: Never Used   Substance Use Topics    Alcohol use: Not Currently     Comment: social    Drug use: No       Family History   Problem Relation Age of Onset    No Known Problems Mother     No Known Problems Father     No Known Problems Sister     No Known Problems Daughter     No Known Problems Maternal Grandmother     No Known Problems Maternal Grandfather     No Known  Problems Paternal Grandmother     No Known Problems Paternal Grandfather     No Known Problems Maternal Aunt     Cancer Maternal Aunt     No Known Problems Maternal Aunt     No Known Problems Maternal Aunt     Leukemia Paternal Aunt     No Known Problems Paternal Aunt     No Known Problems Paternal Uncle     Breast cancer Neg Hx     Colon cancer Neg Hx     Endometrial cancer Neg Hx     Ovarian cancer Neg Hx        Review of Systems     As stated in the HPI. All other systems reviewed and are negative.      Hand/upper extremity injection: R thumb A1  Procedure Details  Condition:trigger finger Location: thumb - R thumb A1   Needle size: 25 G  Ultrasound guidance: no  Approach: volar  Medications administered: 1 mL lidocaine 1 %; 6 mg betamethasone acetate-betamethasone sodium phosphate 6 (3-3) mg/mL  Patient tolerance: patient tolerated the procedure well with no immediate complications  Dressing:  Sterile dressing applied

## 2024-02-08 ENCOUNTER — ULTRASOUND (OUTPATIENT)
Dept: GYNECOLOGY | Facility: CLINIC | Age: 49
End: 2024-02-08
Payer: COMMERCIAL

## 2024-02-08 DIAGNOSIS — R10.2 PELVIC PAIN: Primary | ICD-10-CM

## 2024-02-08 PROCEDURE — 76830 TRANSVAGINAL US NON-OB: CPT | Performed by: OBSTETRICS & GYNECOLOGY

## 2024-02-08 NOTE — PROGRESS NOTES
AMB US Pelvic Non OB    Date/Time: 2/8/2024 10:00 AM    Performed by: Ursula Metzger  Authorized by: C William Riedel, DO  Universal Protocol:  Consent given by: patient  Patient understanding: patient states understanding of the procedure being performed  Patient identity confirmed: verbally with patient    Procedure details:     SIS Procedure: No    Indications: non-obstetric abdominal pain      Technique:  Transvaginal US, Non-OB    Position: lithotomy exam    Uterine findings:     Length (cm): 6.72    Height (cm):  3.5    Width (cm):  5.33    Endometrial stripe: identified      Endometrium thickness (mm):  2.7  Left ovary findings:     Left ovary:  Visualized    Length (cm): 3.01    Height (cm): 1.81    Width (cm): 1.95  Right ovary findings:     Right ovary:  Visualized    Length (cm): 3.23    Height (cm): 2.51    Width (cm): 3.09  Other findings:     Free pelvic fluid: not identified      Free peritoneal fluid: not identified    Post-Procedure Details:     Impression:  Anteverted uterus is inhomogeneous throughout without fibroids as noted on previous ultrasound suggestive of adenomyosis. There are multiple nabothian cysts noted in the cervix. Bilateral ovaries appear within normal limits. The right ovary demonstrates a 1.5cm follicle. No free fluid.     Tolerance:  Tolerated well, no immediate complications    Complications: no complications    Additional Procedure Comments:      Takkle F8 E8C-RS transvaginal transducer Serial # [id number] was used to perform the examination today and subsequently followed with high level disinfection utilizing Trophon EPR procedure.     Ultrasound performed at:     [facility]  [address] [place] [address]  Phone:  [phone]  Fax:  [phone]

## 2024-02-12 ENCOUNTER — OFFICE VISIT (OUTPATIENT)
Dept: GYNECOLOGY | Facility: CLINIC | Age: 49
End: 2024-02-12
Payer: COMMERCIAL

## 2024-02-12 VITALS
DIASTOLIC BLOOD PRESSURE: 64 MMHG | BODY MASS INDEX: 22.91 KG/M2 | HEIGHT: 64 IN | WEIGHT: 134.2 LBS | SYSTOLIC BLOOD PRESSURE: 102 MMHG

## 2024-02-12 DIAGNOSIS — Z90.79 STATUS POST BILATERAL SALPINGECTOMY: ICD-10-CM

## 2024-02-12 DIAGNOSIS — R10.2 PELVIC PAIN IN FEMALE: ICD-10-CM

## 2024-02-12 DIAGNOSIS — N80.9 ENDOMETRIOSIS DETERMINED BY LAPAROSCOPY: Primary | ICD-10-CM

## 2024-02-12 DIAGNOSIS — Z98.890 STATUS POST ENDOMETRIAL ABLATION: ICD-10-CM

## 2024-02-12 PROCEDURE — 99213 OFFICE O/P EST LOW 20 MIN: CPT | Performed by: OBSTETRICS & GYNECOLOGY

## 2024-02-12 NOTE — PROGRESS NOTES
"Assessment/Plan:    Diagnoses and all orders for this visit:    Endometriosis determined by laparoscopy  -     US pelvis complete non OB; Future    Status post endometrial ablation  -     US pelvis complete non OB; Future    Status post bilateral salpingectomy  -     US pelvis complete non OB; Future    Pelvic pain in female        Subjective: Here for pelvic ultrasound and follow-up visit     Patient ID: Tayo Sorto is a 48 y.o. female.    HPI  48-year-old female  1 para 1 history of endometrial ablation as well as bilateral salpingectomy.  She was identified to have pelvic endometriosis during her laparoscopy.  She has had some breakthrough bleeding symptoms on occasion and also some mild to moderate pelvic pain symptoms as well.  She is occasionally sexually active without intercourse.  Denies pelvic pain related to sexual activity.  Pelvic ultrasound anteverted uterus measures 6.72 x 3.5 x 5.3 cm.  Heterogenous changes throughout without fibroids noted suggestive of adenomyosis.  Bilateral ovaries appear to be within normal limits endometrial stripe was 2.7 mm.  There was a right ovarian follicle measuring at 1.5 mm.  No free fluid was noted.  These findings were discussed reviewed with the patient in detail.  She is not having any breakthrough bleeding symptoms does have intermittent pelvic pain symptoms 4-5 out of 10 recommend 3-month follow-up ultrasound.  Patient instructed to call if her pain or bleeding symptoms recur or get worse.  All questions answered.    Review of Systems unchanged    Objective: No acute distress  /64 (BP Location: Left arm, Patient Position: Sitting, Cuff Size: Standard)   Ht 5' 4\" (1.626 m)   Wt 60.9 kg (134 lb 3.2 oz)   BMI 23.04 kg/m²      Physical Exam  Vitals and nursing note reviewed.   Constitutional:       Appearance: Normal appearance. She is normal weight.   HENT:      Head: Normocephalic.   Eyes:      Pupils: Pupils are equal, round, and reactive to " light.   Abdominal:      General: Abdomen is flat.      Palpations: Abdomen is soft.   Genitourinary:     Comments: Pelvic exam deferred today.  Musculoskeletal:         General: Normal range of motion.   Skin:     General: Skin is warm.   Neurological:      Mental Status: She is alert and oriented to person, place, and time.   Psychiatric:         Mood and Affect: Mood normal.         Behavior: Behavior normal.         Thought Content: Thought content normal.         Judgment: Judgment normal.        Please note    In addition to the time spent discussing the findings and results of today's visit and exam, I spent approximately 20 minutes of face-to-face time with the patient, greater than 50% of which was spent in counseling and coordination of care for this patient.

## 2024-02-21 ENCOUNTER — OFFICE VISIT (OUTPATIENT)
Dept: FAMILY MEDICINE CLINIC | Facility: CLINIC | Age: 49
End: 2024-02-21
Payer: COMMERCIAL

## 2024-02-21 VITALS
TEMPERATURE: 97.1 F | DIASTOLIC BLOOD PRESSURE: 70 MMHG | WEIGHT: 134.8 LBS | OXYGEN SATURATION: 99 % | BODY MASS INDEX: 22.46 KG/M2 | HEIGHT: 65 IN | RESPIRATION RATE: 16 BRPM | SYSTOLIC BLOOD PRESSURE: 118 MMHG | HEART RATE: 75 BPM

## 2024-02-21 DIAGNOSIS — L98.9 FACIAL SKIN LESION: ICD-10-CM

## 2024-02-21 DIAGNOSIS — Z00.00 HEALTH CARE MAINTENANCE: Primary | ICD-10-CM

## 2024-02-21 DIAGNOSIS — H91.93 BILATERAL HEARING LOSS, UNSPECIFIED HEARING LOSS TYPE: ICD-10-CM

## 2024-02-21 DIAGNOSIS — J30.2 SEASONAL ALLERGIES: ICD-10-CM

## 2024-02-21 DIAGNOSIS — R53.83 FATIGUE, UNSPECIFIED TYPE: ICD-10-CM

## 2024-02-21 DIAGNOSIS — B35.4 TINEA CORPORIS: ICD-10-CM

## 2024-02-21 DIAGNOSIS — G43.909 MIGRAINE WITHOUT STATUS MIGRAINOSUS, NOT INTRACTABLE, UNSPECIFIED MIGRAINE TYPE: ICD-10-CM

## 2024-02-21 DIAGNOSIS — G47.00 INSOMNIA, UNSPECIFIED TYPE: ICD-10-CM

## 2024-02-21 PROCEDURE — 99396 PREV VISIT EST AGE 40-64: CPT | Performed by: FAMILY MEDICINE

## 2024-02-21 RX ORDER — CLOTRIMAZOLE AND BETAMETHASONE DIPROPIONATE 10; .64 MG/G; MG/G
CREAM TOPICAL 2 TIMES DAILY
Qty: 45 G | Refills: 1 | Status: SHIPPED | OUTPATIENT
Start: 2024-02-21

## 2024-02-21 NOTE — PROGRESS NOTES
Chief Complaint   Patient presents with    Well Check     Pt would also like discuss red dots on her face and skin discoloration on belly area. Pt would like her ears checked      Patient Instructions   Here for establishing care and will need updated labs for hx of fatigue and will rec referral to see ENT for hearing loss and rec seeing GYN for pap smears and gets mammograms as directed and rec SBE as directed. Use sunscreen and monitor for ticks and rec derm consult for facial lesions. Start lotrisone for ring worm on trunk.   Assessment/Plan:    No problem-specific Assessment & Plan notes found for this encounter.       Diagnoses and all orders for this visit:    Health care maintenance  -     Comprehensive metabolic panel; Future  -     CBC and differential; Future  -     Lipid Panel with Direct LDL reflex; Future  -     TSH, 3rd generation; Future  -     Iron Panel (Includes Ferritin, Iron Sat%, Iron, and TIBC); Future  -     Vitamin B12/Folate, Serum Panel; Future  -     Vitamin D 25 hydroxy; Future  -     T4, free; Future    Migraine without status migrainosus, not intractable, unspecified migraine type  -     Comprehensive metabolic panel; Future  -     CBC and differential; Future    Fatigue, unspecified type  -     Comprehensive metabolic panel; Future  -     CBC and differential; Future  -     TSH, 3rd generation; Future  -     Iron Panel (Includes Ferritin, Iron Sat%, Iron, and TIBC); Future  -     Vitamin B12/Folate, Serum Panel; Future  -     Vitamin D 25 hydroxy; Future  -     T4, free; Future    Facial skin lesion  -     Ambulatory Referral to Dermatology; Future    Bilateral hearing loss, unspecified hearing loss type  -     Ambulatory Referral to Otolaryngology; Future    Tinea corporis  -     clotrimazole-betamethasone (LOTRISONE) 1-0.05 % cream; Apply topically 2 (two) times a day Up to a month prn ringworm    Seasonal allergies    Insomnia, unspecified type          Subjective:      Patient ID:  "Tayo Sorto is a 48 y.o. female.    Well Check (Pt would also like discuss red dots on her face and skin discoloration on belly area. Pt would like her ears checked ) Sees GYN and and gets mammograms. Cologuard is UTD. Single and has 1 child age 12 yo girl and is a  in Insurance. Patient is . Patient does exercise and tries to eat healthy. Issues with left knee pain. Did go to therapy. Patient sleeps 6 hours per night. Sometimes has trouble sleeping. Hx of migraines. Stable migraines.         The following portions of the patient's history were reviewed and updated as appropriate: allergies, current medications, past family history, past medical history, past social history, past surgical history, and problem list.    Review of Systems   Constitutional:  Positive for fatigue.   HENT:          Difficulty hearing   Eyes: Negative.    Respiratory: Negative.     Cardiovascular: Negative.    Gastrointestinal: Negative.    Endocrine: Negative.    Genitourinary: Negative.    Musculoskeletal: Negative.    Skin:  Positive for rash (facial).   Allergic/Immunologic: Negative.    Neurological: Negative.    Hematological: Negative.    Psychiatric/Behavioral: Negative.           Objective:      /70   Pulse 75   Temp (!) 97.1 °F (36.2 °C) (Temporal)   Resp 16   Ht 5' 4.57\" (1.64 m)   Wt 61.1 kg (134 lb 12.8 oz)   SpO2 99%   BMI 22.73 kg/m²          Physical Exam  Constitutional:       Appearance: She is well-developed.   HENT:      Head: Normocephalic and atraumatic.      Right Ear: Tympanic membrane, ear canal and external ear normal.      Left Ear: Tympanic membrane, ear canal and external ear normal.      Nose: Nose normal.      Mouth/Throat:      Mouth: Mucous membranes are moist.   Eyes:      Conjunctiva/sclera: Conjunctivae normal.      Pupils: Pupils are equal, round, and reactive to light.   Cardiovascular:      Rate and Rhythm: Normal rate and regular rhythm.      Pulses: Normal " pulses.      Heart sounds: Normal heart sounds.   Pulmonary:      Effort: Pulmonary effort is normal.      Breath sounds: Normal breath sounds.   Abdominal:      General: Abdomen is flat. Bowel sounds are normal.      Palpations: Abdomen is soft.   Musculoskeletal:         General: Normal range of motion.      Cervical back: Normal range of motion and neck supple.   Skin:     General: Skin is warm and dry.      Capillary Refill: Capillary refill takes less than 2 seconds.   Neurological:      General: No focal deficit present.      Mental Status: She is alert and oriented to person, place, and time. Mental status is at baseline.      Deep Tendon Reflexes: Reflexes are normal and symmetric.   Psychiatric:         Mood and Affect: Mood normal.         Behavior: Behavior normal.         Thought Content: Thought content normal.         Judgment: Judgment normal.

## 2024-02-21 NOTE — PATIENT INSTRUCTIONS
Here for establishing care and will need updated labs for hx of fatigue and will rec referral to see ENT for hearing loss and rec seeing GYN for pap smears and gets mammograms as directed and rec SBE as directed. Use sunscreen and monitor for ticks and rec derm consult for facial lesions. Start lotrisone for ring worm on trunk.

## 2024-02-22 LAB
25(OH)D3 SERPL-MCNC: 76 NG/ML (ref 30–100)
ALBUMIN SERPL-MCNC: 4.6 G/DL (ref 3.6–5.1)
ALBUMIN/GLOB SERPL: 1.7 (CALC) (ref 1–2.5)
ALP SERPL-CCNC: 92 U/L (ref 31–125)
ALT SERPL-CCNC: 27 U/L (ref 6–29)
AST SERPL-CCNC: 10 U/L (ref 10–35)
BASOPHILS # BLD AUTO: 37 CELLS/UL (ref 0–200)
BASOPHILS NFR BLD AUTO: 0.5 %
BILIRUB SERPL-MCNC: 0.6 MG/DL (ref 0.2–1.2)
BUN SERPL-MCNC: 15 MG/DL (ref 7–25)
BUN/CREAT SERPL: NORMAL (CALC) (ref 6–22)
CALCIUM SERPL-MCNC: 9.8 MG/DL (ref 8.6–10.2)
CHLORIDE SERPL-SCNC: 102 MMOL/L (ref 98–110)
CHOLEST SERPL-MCNC: 209 MG/DL
CHOLEST/HDLC SERPL: 3.6 (CALC)
CO2 SERPL-SCNC: 27 MMOL/L (ref 20–32)
CREAT SERPL-MCNC: 0.55 MG/DL (ref 0.5–0.99)
EOSINOPHIL # BLD AUTO: 212 CELLS/UL (ref 15–500)
EOSINOPHIL NFR BLD AUTO: 2.9 %
ERYTHROCYTE [DISTWIDTH] IN BLOOD BY AUTOMATED COUNT: 12 % (ref 11–15)
FERRITIN SERPL-MCNC: 60 NG/ML (ref 16–232)
FOLATE SERPL-MCNC: 18.9 NG/ML
GFR/BSA.PRED SERPLBLD CYS-BASED-ARV: 114 ML/MIN/1.73M2
GLOBULIN SER CALC-MCNC: 2.7 G/DL (CALC) (ref 1.9–3.7)
GLUCOSE SERPL-MCNC: 94 MG/DL (ref 65–99)
HCT VFR BLD AUTO: 38.5 % (ref 35–45)
HDLC SERPL-MCNC: 58 MG/DL
HGB BLD-MCNC: 12.9 G/DL (ref 11.7–15.5)
IRON SATN MFR SERPL: 38 % (CALC) (ref 16–45)
IRON SERPL-MCNC: 127 MCG/DL (ref 40–190)
LDLC SERPL CALC-MCNC: 130 MG/DL (CALC)
LYMPHOCYTES # BLD AUTO: 2154 CELLS/UL (ref 850–3900)
LYMPHOCYTES NFR BLD AUTO: 29.5 %
MCH RBC QN AUTO: 29.6 PG (ref 27–33)
MCHC RBC AUTO-ENTMCNC: 33.5 G/DL (ref 32–36)
MCV RBC AUTO: 88.3 FL (ref 80–100)
MONOCYTES # BLD AUTO: 569 CELLS/UL (ref 200–950)
MONOCYTES NFR BLD AUTO: 7.8 %
NEUTROPHILS # BLD AUTO: 4329 CELLS/UL (ref 1500–7800)
NEUTROPHILS NFR BLD AUTO: 59.3 %
NONHDLC SERPL-MCNC: 151 MG/DL (CALC)
PLATELET # BLD AUTO: 272 THOUSAND/UL (ref 140–400)
PMV BLD REES-ECKER: 12 FL (ref 7.5–12.5)
POTASSIUM SERPL-SCNC: 4.9 MMOL/L (ref 3.5–5.3)
PROT SERPL-MCNC: 7.3 G/DL (ref 6.1–8.1)
RBC # BLD AUTO: 4.36 MILLION/UL (ref 3.8–5.1)
SODIUM SERPL-SCNC: 138 MMOL/L (ref 135–146)
T4 FREE SERPL-MCNC: 1 NG/DL (ref 0.8–1.8)
TIBC SERPL-MCNC: 336 MCG/DL (CALC) (ref 250–450)
TRIGL SERPL-MCNC: 100 MG/DL
TSH SERPL-ACNC: 2.45 MIU/L
VIT B12 SERPL-MCNC: 597 PG/ML (ref 200–1100)
WBC # BLD AUTO: 7.3 THOUSAND/UL (ref 3.8–10.8)

## 2024-05-10 NOTE — PROGRESS NOTES
AMB US Pelvic Non OB    Date/Time: 5/13/2024 10:15 AM    Performed by: Ursula Metzger  Authorized by: C William Riedel, DO  Universal Protocol:  Consent: Verbal consent obtained.  Consent given by: patient  Timeout called at: 5/13/2024 10:14 AM.  Patient understanding: patient states understanding of the procedure being performed  Patient identity confirmed: verbally with patient    Procedure details:     SIS Procedure: No    Indications: non-obstetric vaginal bleeding      Technique:  Transvaginal US, Non-OB    Position: lithotomy exam    Uterine findings:     Length (cm): 7.46    Height (cm):  4.07    Width (cm):  5.39    Endometrial stripe: identified      Endometrium thickness (mm):  4.2  Left ovary findings:     Left ovary:  Visualized    Length (cm): 3.05    Height (cm): 1.7    Width (cm): 1.79  Right ovary findings:     Right ovary:  Visualized    Length (cm): 2.47    Height (cm): 1.36    Width (cm): 1.69  Other findings:     Free pelvic fluid: not identified      Free peritoneal fluid: not identified    Post-Procedure Details:     Impression:  Anteverted uterus is inhomogeneous throughout without fibroids suggestive of adenomyosis. There are multiple nabothian cysts in the cervix. Bilateral ovaries appear within normal limits. No free fluid.    Tolerance:  Tolerated well, no immediate complications    Complications: no complications    Additional Procedure Comments:      Eyefreight F8 E8C-RS transvaginal transducer Serial # [id number] was used to perform the examination today and subsequently followed with high level disinfection utilizing Trophon EPR procedure.     Ultrasound performed at:     [facility]  [address] [place] [address]  Phone:  [phone]  Fax:  [phone]

## 2024-05-13 ENCOUNTER — ULTRASOUND (OUTPATIENT)
Dept: GYNECOLOGY | Facility: CLINIC | Age: 49
End: 2024-05-13
Payer: COMMERCIAL

## 2024-05-13 ENCOUNTER — OFFICE VISIT (OUTPATIENT)
Dept: GYNECOLOGY | Facility: CLINIC | Age: 49
End: 2024-05-13
Payer: COMMERCIAL

## 2024-05-13 VITALS
SYSTOLIC BLOOD PRESSURE: 108 MMHG | WEIGHT: 128.4 LBS | BODY MASS INDEX: 21.92 KG/M2 | HEIGHT: 64 IN | DIASTOLIC BLOOD PRESSURE: 74 MMHG

## 2024-05-13 DIAGNOSIS — Z98.890 STATUS POST ENDOMETRIAL ABLATION: ICD-10-CM

## 2024-05-13 DIAGNOSIS — N93.9 ABNORMAL UTERINE BLEEDING (AUB): Primary | ICD-10-CM

## 2024-05-13 DIAGNOSIS — Z90.79 STATUS POST BILATERAL SALPINGECTOMY: ICD-10-CM

## 2024-05-13 DIAGNOSIS — R10.2 PELVIC PAIN: ICD-10-CM

## 2024-05-13 DIAGNOSIS — N80.9 ENDOMETRIOSIS DETERMINED BY LAPAROSCOPY: ICD-10-CM

## 2024-05-13 DIAGNOSIS — Z98.890 STATUS POST ENDOMETRIAL ABLATION: Primary | ICD-10-CM

## 2024-05-13 PROCEDURE — 76830 TRANSVAGINAL US NON-OB: CPT | Performed by: OBSTETRICS & GYNECOLOGY

## 2024-05-13 PROCEDURE — 99213 OFFICE O/P EST LOW 20 MIN: CPT | Performed by: OBSTETRICS & GYNECOLOGY

## 2024-05-13 NOTE — PROGRESS NOTES
"Assessment/Plan:    Diagnoses and all orders for this visit:    Status post endometrial ablation    Status post bilateral salpingectomy    Endometriosis determined by laparoscopy    Pelvic pain        Subjective: Here for follow-up pelvic ultrasound     Patient ID: Tayo Sorto is a 48 y.o. female.    HPI  48-year-old female  1 para 1 today for follow-up pelvic ultrasound.  She was last seen here in February of this past year.  Had a normal pelvic exam and annual exam earlier this year.  History of endometrial ablation and bilateral salpingectomy.  She was having occasional pelvic pain symptoms and occasional mild breakthrough bleeding symptoms.  Over the past 2 and half months she has had very minimal breakthrough bleeding symptoms and not having any pelvic pain.  She is rarely sexually active alone or with a partner.  Follow-up pelvic ultrasound today reveals a normal size uterus measuring 7.46 x 4.07 x 5.39 cm.  Endometrial thickness is 4.2 mm.  Anteverted uterus with heterogenous changes throughout without fibroids suggestive of adenomyosis.  Multiple nabothian cysts in the cervix.  Bilateral ovaries appear to be within normal limits no free fluid noted.  Findings of this ultrasound were discussed reviewed with the patient today.  Overall she seems improved recommend monitor for now return in 3 months or sooner if needed.  All questions answered.    Review of Systems unchanged    Objective: No acute distress  ./74 (BP Location: Left arm, Patient Position: Sitting, Cuff Size: Standard)   Ht 5' 4\" (1.626 m)   Wt 58.2 kg (128 lb 6.4 oz)   BMI 22.04 kg/m²      Physical Exam  Vitals and nursing note reviewed.   Constitutional:       Appearance: Normal appearance. She is normal weight.   HENT:      Head: Normocephalic.   Eyes:      Pupils: Pupils are equal, round, and reactive to light.   Pulmonary:      Effort: Pulmonary effort is normal.   Genitourinary:     Comments: Pelvic exam " deferred  Musculoskeletal:         General: Normal range of motion.      Cervical back: Normal range of motion.   Neurological:      Mental Status: She is alert and oriented to person, place, and time.   Psychiatric:         Mood and Affect: Mood normal.         Behavior: Behavior normal.         Thought Content: Thought content normal.         Judgment: Judgment normal.        Please note    In addition to the time spent discussing the findings and results of today's visit and exam, I spent approximately 20 minutes of face-to-face time with the patient, greater than 50% of which was spent in counseling and coordination of care for this patient.

## 2024-06-24 ENCOUNTER — OFFICE VISIT (OUTPATIENT)
Dept: FAMILY MEDICINE CLINIC | Facility: CLINIC | Age: 49
End: 2024-06-24
Payer: COMMERCIAL

## 2024-06-24 VITALS
SYSTOLIC BLOOD PRESSURE: 118 MMHG | HEART RATE: 80 BPM | HEIGHT: 64 IN | BODY MASS INDEX: 20.69 KG/M2 | TEMPERATURE: 97.9 F | WEIGHT: 121.2 LBS | DIASTOLIC BLOOD PRESSURE: 68 MMHG | OXYGEN SATURATION: 97 %

## 2024-06-24 DIAGNOSIS — H93.13 TINNITUS OF BOTH EARS: ICD-10-CM

## 2024-06-24 DIAGNOSIS — E78.5 HYPERLIPIDEMIA, UNSPECIFIED HYPERLIPIDEMIA TYPE: ICD-10-CM

## 2024-06-24 DIAGNOSIS — F43.9 STRESS: ICD-10-CM

## 2024-06-24 DIAGNOSIS — R42 DIZZINESS: ICD-10-CM

## 2024-06-24 DIAGNOSIS — Z00.00 HEALTH CARE MAINTENANCE: Primary | ICD-10-CM

## 2024-06-24 PROCEDURE — 99396 PREV VISIT EST AGE 40-64: CPT | Performed by: FAMILY MEDICINE

## 2024-06-24 NOTE — PROGRESS NOTES
Ambulatory Visit  Name: Tayo Sorto      : 1975      MRN: [id number]  Encounter Provider: Gina Buchanan DO  Encounter Date: 2024   Encounter department: [facility]  Chief Complaint   Patient presents with    Physical Exam     Having buzzing in her ears , left is worst , started a few months ago , sleeping issues , has dizziness as well happens sporadically      Patient Instructions   Here for general PE and needs low cholesterol diet and soluble fiber to help. Labs to be recheckd yearly. Rec sunscreen and monitor for ticks. Rec seeing GYN and getting mammogram as directed. See dentist and wear sunglasses as directed. Call if any problems. Colon screening is UTD. Monitor tinnitus. Monitor dizziness.     Assessment & Plan   1. Health care maintenance  Comments:  colon cancer screening.  2. Hyperlipidemia, unspecified hyperlipidemia type  Comments:  low cholesterol diet and exercise encouraged. soluble fiber to help lower ldl cholesterol.  3. Tinnitus of both ears  Comments:  has seen ENT in past and will try to decrease stress and siee if this dissipates.  4. Dizziness  Comments:  consider trial of meclizine and stay well hydrated.  5. Stress  Comments:  stress management encouraged.    [unfilled]  History of Present Illness     Physical Exam (Having buzzing in her ears , left is worst , started a few months ago , sleeping issues , has dizziness as well happens sporadically ) Patient had cologuard and also is single and has 1 child age 11. 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 Patient on treadmill for exercise. Patient tries to eat healthy and has elevated ldl at 130. Sees GYN and does SBE and does get mammograms as directed. Non smoker and does rarely drink alcohol wine once monthly at times.         Review of Systems   Constitutional: Negative.    HENT:  Positive for tinnitus.    Eyes: Negative.    Respiratory: Negative.     Cardiovascular: Negative.   "  Gastrointestinal: Negative.    Endocrine: Negative.    Genitourinary: Negative.    Musculoskeletal: Negative.    Skin: Negative.    Allergic/Immunologic: Negative.    Neurological:  Positive for dizziness.   Hematological: Negative.    Psychiatric/Behavioral: Negative.       Current Outpatient Medications on File Prior to Visit   Medication Sig Dispense Refill    ASHWAGANDHA PO Take 450 mg by mouth 2 (two) times a day      CALCIUM PO Take by mouth      Cholecalciferol (VITAMIN D3) 10 MCG (400 UNIT) CAPS Take by mouth      Echinacea 400 MG CAPS Take 1 capsule by mouth 2 (two) times a day      Multiple Vitamin (MULTIVITAMIN) tablet Take 1 tablet by mouth daily      Omega-3 Fatty Acids (FISH OIL PO) Take by mouth      Probiotic Product (PROBIOTIC PO) Take by mouth      VITAMIN E PO Take by mouth      [DISCONTINUED] clotrimazole-betamethasone (LOTRISONE) 1-0.05 % cream Apply topically 2 (two) times a day Up to a month prn ringworm 45 g 1     No current facility-administered medications on file prior to visit.      Objective     /68   Pulse 80   Temp 97.9 °F (36.6 °C) (Temporal)   Ht 5' 4\" (1.626 m)   Wt 55 kg (121 lb 3.2 oz)   SpO2 97%   BMI 20.80 kg/m²     Physical Exam  Constitutional:       Appearance: Normal appearance. She is well-developed.   HENT:      Head: Normocephalic and atraumatic.      Right Ear: Tympanic membrane, ear canal and external ear normal.      Left Ear: Tympanic membrane, ear canal and external ear normal.      Nose: Nose normal.      Mouth/Throat:      Mouth: Mucous membranes are moist.      Pharynx: Oropharynx is clear.   Eyes:      Conjunctiva/sclera: Conjunctivae normal.      Pupils: Pupils are equal, round, and reactive to light.   Cardiovascular:      Rate and Rhythm: Normal rate and regular rhythm.      Pulses: Normal pulses.      Heart sounds: Normal heart sounds.   Pulmonary:      Effort: Pulmonary effort is normal.      Breath sounds: Normal breath sounds.   Abdominal:    "   General: Abdomen is flat. Bowel sounds are normal.      Palpations: Abdomen is soft.   Musculoskeletal:         General: Normal range of motion.      Cervical back: Normal range of motion and neck supple.   Skin:     General: Skin is warm and dry.      Capillary Refill: Capillary refill takes less than 2 seconds.   Neurological:      General: No focal deficit present.      Mental Status: She is alert and oriented to person, place, and time. Mental status is at baseline.      Deep Tendon Reflexes: Reflexes are normal and symmetric.   Psychiatric:         Mood and Affect: Mood normal.         Behavior: Behavior normal.         Thought Content: Thought content normal.         Judgment: Judgment normal.       Administrative Statements   I have spent a total time of 30 minutes on 06/24/24 In caring for this patient including Diagnostic results, Prognosis, Risks and benefits of tx options, Instructions for management, Patient and family education, Importance of tx compliance, Risk factor reductions, Impressions, Counseling / Coordination of care, Documenting in the medical record, Reviewing / ordering tests, medicine, procedures  , and Obtaining or reviewing history  .

## 2024-06-24 NOTE — PATIENT INSTRUCTIONS
Here for general PE and needs low cholesterol diet and soluble fiber to help. Labs to be recheckd yearly. Rec sunscreen and monitor for ticks. Rec seeing GYN and getting mammogram as directed. See dentist and wear sunglasses as directed. Call if any problems. Colon screening is UTD. Monitor tinnitus. Monitor dizziness.

## 2024-07-24 ENCOUNTER — HOSPITAL ENCOUNTER (OUTPATIENT)
Dept: MAMMOGRAPHY | Facility: CLINIC | Age: 49
Discharge: HOME | End: 2024-07-24
Payer: COMMERCIAL

## 2024-07-24 ENCOUNTER — HOSPITAL ENCOUNTER (OUTPATIENT)
Dept: ULTRASOUND IMAGING | Facility: CLINIC | Age: 49
Discharge: HOME | End: 2024-07-24
Payer: COMMERCIAL

## 2024-07-24 VITALS — BODY MASS INDEX: 21.85 KG/M2 | WEIGHT: 128 LBS | HEIGHT: 64 IN

## 2024-07-24 DIAGNOSIS — R92.8 ABNORMALITY OF LEFT BREAST ON SCREENING MAMMOGRAM: ICD-10-CM

## 2024-07-24 PROCEDURE — 76642 ULTRASOUND BREAST LIMITED: CPT

## 2024-07-24 PROCEDURE — G0279 TOMOSYNTHESIS, MAMMO: HCPCS

## 2024-07-24 PROCEDURE — 77065 DX MAMMO INCL CAD UNI: CPT

## 2024-07-26 ENCOUNTER — TELEPHONE (OUTPATIENT)
Dept: FAMILY MEDICINE CLINIC | Facility: CLINIC | Age: 49
End: 2024-07-26

## 2024-07-26 ENCOUNTER — TELEPHONE (OUTPATIENT)
Age: 49
End: 2024-07-26

## 2024-07-26 NOTE — TELEPHONE ENCOUNTER
----- Message from Gina Buchanan DO sent at 7/24/2024  8:14 AM EDT -----  Please call the patient regarding her abnormal result.     Dr. Carmona her Gyn ordered this breast US and wanted to make sure she follows up with him and for the recommendations below:    IMPRESSION:     Six-month stability of the probably benign complicated cyst in the 1:00 left breast.        ASSESSMENT/BI-RADS CATEGORY:  Left: 3 - Probably Benign  Overall: 3 - Probably Benign     RECOMMENDATION:       - Ultrasound in 6 months for the left breast.       - Diagnostic mammogram in 6 months for both breasts.

## 2024-07-26 NOTE — TELEPHONE ENCOUNTER
Patient called in and stated that she was called 7/25 - 7/26. No notes on patients chart. Called over to the office and warm trans call.

## 2024-08-26 ENCOUNTER — OFFICE VISIT (OUTPATIENT)
Dept: GYNECOLOGY | Facility: CLINIC | Age: 49
End: 2024-08-26
Payer: COMMERCIAL

## 2024-08-26 VITALS
WEIGHT: 125 LBS | SYSTOLIC BLOOD PRESSURE: 116 MMHG | DIASTOLIC BLOOD PRESSURE: 86 MMHG | HEIGHT: 64 IN | BODY MASS INDEX: 21.34 KG/M2

## 2024-08-26 DIAGNOSIS — N80.9 ENDOMETRIOSIS DETERMINED BY LAPAROSCOPY: Primary | ICD-10-CM

## 2024-08-26 DIAGNOSIS — Z90.79 STATUS POST BILATERAL SALPINGECTOMY: ICD-10-CM

## 2024-08-26 DIAGNOSIS — Z98.890 STATUS POST ENDOMETRIAL ABLATION: ICD-10-CM

## 2024-08-26 PROCEDURE — 99213 OFFICE O/P EST LOW 20 MIN: CPT | Performed by: OBSTETRICS & GYNECOLOGY

## 2024-08-26 NOTE — PROGRESS NOTES
"Assessment/Plan:    Diagnoses and all orders for this visit:    Endometriosis determined by laparoscopy    Status post bilateral salpingectomy    Status post endometrial ablation        Subjective: Here for follow-up     Patient ID: Tayo Sorto is a 48 y.o. female.    HPI  48-year-old female  1 para 1 here for routine follow-up.  Does have a history of pelvic endometriosis determined by laparoscopy.  We did a bilateral salpingectomy and endometrial ablation last year she has done well since then.  She has occasional spotting but no more heavy menstrual cycles as before.  Denies any pain with sexual activity.  Denies any pelvic pain whatsoever.  She is due for annual exam in January of this year, no new problems or concerns follow-up at that time.    Review of Systems unchanged    Objective: No acute distress  ./86 (BP Location: Left arm, Patient Position: Sitting, Cuff Size: Standard)   Ht 5' 4\" (1.626 m)   Wt 56.7 kg (125 lb)   BMI 21.46 kg/m²      Physical Exam  Vitals and nursing note reviewed.   Constitutional:       Appearance: Normal appearance. She is normal weight.   HENT:      Head: Normocephalic.   Eyes:      Extraocular Movements: Extraocular movements intact.   Pulmonary:      Effort: Pulmonary effort is normal.   Genitourinary:     Comments: Pelvic exam deferred  Musculoskeletal:         General: Normal range of motion.      Cervical back: Normal range of motion.   Neurological:      Mental Status: She is alert and oriented to person, place, and time.   Psychiatric:         Mood and Affect: Mood normal.         Behavior: Behavior normal.         Thought Content: Thought content normal.         Judgment: Judgment normal.        Please note    In addition to the time spent discussing the findings and results of today's visit and exam, I spent approximately 20 minutes of face-to-face time with the patient, greater than 50% of which was spent in counseling and coordination of care for " this patient.

## 2024-10-04 ENCOUNTER — OFFICE VISIT (OUTPATIENT)
Dept: OBGYN CLINIC | Facility: CLINIC | Age: 49
End: 2024-10-04
Payer: COMMERCIAL

## 2024-10-04 VITALS
BODY MASS INDEX: 21.34 KG/M2 | DIASTOLIC BLOOD PRESSURE: 65 MMHG | SYSTOLIC BLOOD PRESSURE: 98 MMHG | HEIGHT: 64 IN | WEIGHT: 125 LBS

## 2024-10-04 DIAGNOSIS — M65.311 TRIGGER THUMB OF RIGHT HAND: Primary | ICD-10-CM

## 2024-10-04 PROCEDURE — 99213 OFFICE O/P EST LOW 20 MIN: CPT | Performed by: ORTHOPAEDIC SURGERY

## 2024-10-04 NOTE — PROGRESS NOTES
The HAND & UPPER EXTREMITY OFFICE VISIT   Referred By:  Lucien Gallagher PA-C      Chief Complaint:     Right thumb pain     History of Present Illness:   49 y.o., right hand dominant female presents with right thumb pain. I am seeing Tayo in consultation at the request of Lucien Gallagher PA-C. She notes pain to the dorsal and palmar aspect of her right thumb MP joint. She denies any catching, clicking or locking. She did undergo 2 previous right trigger thumb CSI's with Lucien Gallagher, with resolution of her symptoms for a few months. Unfortunately symptoms have returned to pre-injection levels.  She feels she drops objects at times due to her thumb not wanting to fully flex. She has been performing activity modification. She is taking Tylenol for sinus issues but feels this may be helping her thumb pain as well.       ADLs: Community ambulator  Smoke: former   ETOH: No   Drugs:  No Job: Manager (computer work)       Past Medical History:  Past Medical History:   Diagnosis Date    Abnormal Pap smear of cervix     Bronchitis     chronic for about two years - age late 30s    Endometriosis determined by laparoscopy 08/23/2023    History of transfusion 02/14/2013    Migraine      Past Surgical History:   Procedure Laterality Date    IN HYSTEROSCOPY BX ENDOMETRIUM&/POLYPC W/WO D&C N/A 8/9/2023    Procedure: (D&C) W/ HYSTEROSCOPY;  Surgeon: C William Riedel, DO;  [facility];  Service: Gynecology    IN HYSTEROSCOPY ENDOMETRIAL ABLATION N/A 8/9/2023    Procedure: ABLATION ENDOMETRIAL IRMA;  Surgeon: C William Riedel, DO;  [facility];  Service: Gynecology    IN LAPAROSCOPY W/RMVL ADNEXAL STRUCTURES Bilateral 8/9/2023    Procedure: SALPINGECTOMY, LAP;  Surgeon: C William Riedel, DO;  [facility];  Service: Gynecology    IN LAPS ABD PRTM&OMENTUM DX W/WO SPEC BR/WA SPX N/A 8/9/2023    Procedure: LAPAROSCOPY DIAGNOSTIC;  Surgeon: C William Riedel, DO;  [facility];  Service: Gynecology    [place]  "TOOTH EXTRACTION       Family History   Problem Relation Age of Onset    No Known Problems Mother     No Known Problems Father     No Known Problems Sister     No Known Problems Daughter     No Known Problems Maternal Grandmother     No Known Problems Maternal Grandfather     No Known Problems Paternal Grandmother     No Known Problems Paternal Grandfather     No Known Problems Maternal Aunt     Cancer Maternal Aunt     No Known Problems Maternal Aunt     No Known Problems Maternal Aunt     Leukemia Paternal Aunt     No Known Problems Paternal Aunt     No Known Problems Paternal Uncle     Breast cancer Neg Hx     Colon cancer Neg Hx     Endometrial cancer Neg Hx     Ovarian cancer Neg Hx      Social History     Socioeconomic History    Marital status:      Spouse name: Not on file    Number of children: Not on file    Years of education: Not on file    Highest education level: Not on file   Occupational History    Not on file   Tobacco Use    Smoking status: Former    Smokeless tobacco: Former   Vaping Use    Vaping status: Never Used   Substance and Sexual Activity    Alcohol use: Not Currently     Comment: social    Drug use: No    Sexual activity: Not Currently     Birth control/protection: OCP   Other Topics Concern    Not on file   Social History Narrative    Employed    1 child     Social Determinants of Health     Financial Resource Strain: Not on file   Food Insecurity: Not on file   Transportation Needs: Not on file   Physical Activity: Not on file   Stress: Not on file   Social Connections: Not on file   Intimate Partner Violence: Not on file   Housing Stability: Not on file     Scheduled Meds:  Continuous Infusions:No current facility-administered medications for this visit.    PRN Meds:.  Allergies   Allergen Reactions    Peanut (Diagnostic) - Food Allergy Lip Swelling    [place] (Diagnostic) - Food Allergy Rash           Physical Examination:    BP 98/65   Ht 5' 4\" (1.626 m)   Wt 56.7 kg (125 " lb)   BMI 21.46 kg/m²     Gen: A&Ox3, NAD  Cardiac: regular rate  Chest: non labored breathing  Abdomen: Non-distended        Right Upper Extremity:  Skin CDI  No obvious deformity of the shoulder, arm, elbow, forearm, wrist, hand  Thumb A1 pulley tenderness  Positive milde, palpable click with thumb active flexion  Negative amrtin locking   Sensation intact to light touch in the axillary median, ulnar, and radial nerve distributions  5/5 motor   Full digital extension   Full composite fist   2+RP      Left Upper Extremity:  Skin CDI  No obvious deformity of the shoulder, arm, elbow, forearm, wrist, hand  Full digital extension   Full composite fist   Sensation intact to light touch in the axillary median, ulnar, and radial nerve distributions  5/5 motor   2+RP      Studies:  Radiographs: I personally reviewed and independently interpreted the available radiographs.  9/22/23: Radiographs of the right hand, multiple views, demonstrate no acute fracture or dislocation. No significant degenerative changes. May have subtle degenerative changes to the metacarpal head.        Assessment and Plan:  1. Trigger thumb of right hand            49 y.o. female presents with signs and symptoms consistent with the above diagnosis.  We discussed the natural history of this condition and its pathogenesis.  We discussed operative and nonoperative treatment options.  Treatment options were discussed in the form of a 3rd right trigger thumb CSI, vs monitoring symptoms vs a trigger thumb release. Risks of surgery consist of but not limited to bleeding, infection, stiffness, pain, injury to nerves blood vessels and surrounding structures, reoccurrence, wound healing complications, need for additional surgery. We discussed this is an approx. 2 week recovery and she does have some restrictions while the sutures are in place. Post operative instructions/restrictions were reviewed. She may drive and type post op, avoid forceful gripping.  Surgery would be performed under local. Tayo's symptoms are livable at this time. She will monitor her symptoms. I will see her back in the office on an as needed basis.     she expressed understanding of the plan and agreed. We encouraged them to contact our office with any questions or concerns.         Artemio Keller MD  Hand and Upper Extremity Surgery        *This note was dictated using Dragon voice recognition software. Please excuse any word substitutions or errors.*      Scribe Attestation      I,:  Jerri Albert MA am acting as a scribe while in the presence of the attending physician.:       I,:  Artemio Keller MD personally performed the services described in this documentation    as scribed in my presence.:

## 2025-01-16 ENCOUNTER — ANNUAL EXAM (OUTPATIENT)
Dept: GYNECOLOGY | Facility: CLINIC | Age: 50
End: 2025-01-16
Payer: COMMERCIAL

## 2025-01-16 VITALS
SYSTOLIC BLOOD PRESSURE: 102 MMHG | BODY MASS INDEX: 22.94 KG/M2 | DIASTOLIC BLOOD PRESSURE: 70 MMHG | HEIGHT: 64 IN | WEIGHT: 134.4 LBS

## 2025-01-16 DIAGNOSIS — N80.9 ENDOMETRIOSIS: ICD-10-CM

## 2025-01-16 DIAGNOSIS — Z01.419 WOMEN'S ANNUAL ROUTINE GYNECOLOGICAL EXAMINATION: Primary | ICD-10-CM

## 2025-01-16 DIAGNOSIS — Z98.890 STATUS POST ENDOMETRIAL ABLATION: ICD-10-CM

## 2025-01-16 DIAGNOSIS — Z90.79 STATUS POST BILATERAL SALPINGECTOMY: ICD-10-CM

## 2025-01-16 DIAGNOSIS — Z87.42 HISTORY OF ABNORMAL CERVICAL PAP SMEAR: ICD-10-CM

## 2025-01-16 PROCEDURE — 99396 PREV VISIT EST AGE 40-64: CPT | Performed by: OBSTETRICS & GYNECOLOGY

## 2025-01-16 NOTE — PROGRESS NOTES
Assessment & Plan   Diagnoses and all orders for this visit:    Women's annual routine gynecological examination    Endometriosis    Status post bilateral salpingectomy    Status post endometrial ablation    History of abnormal cervical Pap smear    1. yearly exam-Pap smear deferred, self breast awareness reviewed, calcium/vitamin D recommendations discussed, mammogram request given, Cologuard negative from 2/6/2023.  Patient counseled about 92% sensitivity versus colonoscopy, would recommend follow-up Cologuard February 2026 or colonoscopy going forward.  2. history of endometriosis-noted via laparoscopy.  She denies any significant pain.  She is not sexually active so she cannot attest to dyspareunia.  She will call or return with any such symptoms.    3.  History of heavy menses-status post endometrial ablation 8/9/2023 along with bilateral salpingectomy.  She has had no bleeding since then.  4. contraception-status post laparoscopic bilateral salpingectomy  5. history of abnormal Pap-looking through her chart, had colposcopy September 2018 with negative biopsy at that time.  HPV 16 was last positive on 3/4/2019 with negative HPV and Pap testing x 6 since then.  Pap was deferred today as per current guidelines with patient agreement.  6. history of [id number] as per treating doctor  7.  Other-daughter is about to be age 12 on 2/14/2025.  According to mom, already asked like a teenager.  Support was given.  8.  Small external hemorrhoid-asymptomatic.  Patient counseled.  Follow-up 1 year for yearly exam or as needed.    Subjective   Patient ID: Tayo Sorto is a 49 y.o. female.    Vitals:    01/16/25 0903   BP: 102/70     Patient was seen today for yearly exam.  Please see assessment plan for details.        The following portions of the patient's history were reviewed and updated as appropriate: allergies, current medications, past family history, past medical history, past social history, past  surgical history, and problem list.  Past Medical History:   Diagnosis Date    Abnormal Pap smear of cervix     Bronchitis     chronic for about two years - age late 30s    Endometriosis determined by laparoscopy 2023    History of transfusion 2013    Migraine      Past Surgical History:   Procedure Laterality Date    CT HYSTEROSCOPY BX ENDOMETRIUM&/POLYPC W/WO D&C N/A 2023    Procedure: (D&C) W/ HYSTEROSCOPY;  Surgeon: C William Riedel, DO;  [facility];  Service: Gynecology    CT HYSTEROSCOPY ENDOMETRIAL ABLATION N/A 2023    Procedure: ABLATION ENDOMETRIAL IRMA;  Surgeon: C William Riedel, DO;  [facility];  Service: Gynecology    CT LAPAROSCOPY W/RMVL ADNEXAL STRUCTURES Bilateral 2023    Procedure: SALPINGECTOMY, LAP;  Surgeon: C William Riedel, DO;  [facility];  Service: Gynecology    CT LAPS ABD PRTM&OMENTUM DX W/WO SPEC BR/WA SPX N/A 2023    Procedure: LAPAROSCOPY DIAGNOSTIC;  Surgeon: C William Riedel, DO;  [facility];  Service: Gynecology    WISDOM TOOTH EXTRACTION       OB History    Para Term  AB Living   2 1 1  1 1   SAB IAB Ectopic Multiple Live Births    1   1      # Outcome Date GA Lbr Osbaldo/2nd Weight Sex Type Anes PTL Lv   2 Term 15    F Vag-Spont   SMITA   1 IAB                Current Outpatient Medications:     ASHWAGANDHA PO, Take 450 mg by mouth 2 (two) times a day, Disp: , Rfl:     CALCIUM PO, Take by mouth, Disp: , Rfl:     Cholecalciferol (VITAMIN D3) 10 MCG (400 UNIT) CAPS, Take by mouth, Disp: , Rfl:     Echinacea 400 MG CAPS, Take 1 capsule by mouth 2 (two) times a day, Disp: , Rfl:     Multiple Vitamin (MULTIVITAMIN) tablet, Take 1 tablet by mouth daily, Disp: , Rfl:     Omega-3 Fatty Acids (FISH OIL PO), Take by mouth, Disp: , Rfl:     Probiotic Product (PROBIOTIC PO), Take by mouth, Disp: , Rfl:     VITAMIN E PO, Take by mouth, Disp: , Rfl:   Allergies   Allergen Reactions    Peanut (Diagnostic) - Food  Allergy Lip Swelling    Pollen Extract Cough, Nasal Congestion and Sneezing    [place] (Diagnostic) - Food Allergy Rash     Social History     Socioeconomic History    Marital status:      Spouse name: None    Number of children: None    Years of education: None    Highest education level: None   Occupational History    None   Tobacco Use    Smoking status: Former    Smokeless tobacco: Former   Vaping Use    Vaping status: Never Used   Substance and Sexual Activity    Alcohol use: Not Currently     Comment: social    Drug use: No    Sexual activity: Not Currently     Birth control/protection: OCP   Other Topics Concern    None   Social History Narrative    Employed    1 child     Social Drivers of Health     Financial Resource Strain: Not on file   Food Insecurity: Not on file   Transportation Needs: Not on file   Physical Activity: Not on file   Stress: Not on file   Social Connections: Not on file   Intimate Partner Violence: Not on file   Housing Stability: Not on file     Family History   Problem Relation Age of Onset    No Known Problems Mother     No Known Problems Father     No Known Problems Sister     No Known Problems Daughter     No Known Problems Maternal Grandmother     No Known Problems Maternal Grandfather     No Known Problems Paternal Grandmother     No Known Problems Paternal Grandfather     No Known Problems Maternal Aunt     Cancer Maternal Aunt     No Known Problems Maternal Aunt     No Known Problems Maternal Aunt     Leukemia Paternal Aunt     No Known Problems Paternal Aunt     No Known Problems Paternal Uncle     Breast cancer Neg Hx     Colon cancer Neg Hx     Endometrial cancer Neg Hx     Ovarian cancer Neg Hx        Review of Systems   Constitutional:  Negative for chills, diaphoresis, fatigue and fever.   Respiratory:  Negative for apnea, cough, chest tightness, shortness of breath and wheezing.    Cardiovascular:  Negative for chest pain, palpitations and leg swelling.  "  Gastrointestinal:  Negative for abdominal distention, abdominal pain, anal bleeding, constipation, diarrhea, nausea, rectal pain and vomiting.   Genitourinary:  Negative for difficulty urinating, dyspareunia, dysuria, frequency, hematuria, menstrual problem, pelvic pain, urgency, vaginal bleeding, vaginal discharge and vaginal pain.   Musculoskeletal:  Negative for arthralgias, back pain and myalgias.   Skin:  Negative for color change and rash.   Neurological:  Negative for dizziness, syncope, light-headedness, numbness and headaches.   Hematological:  Negative for adenopathy. Does not bruise/bleed easily.   Psychiatric/Behavioral:  Negative for dysphoric mood and sleep disturbance. The patient is not nervous/anxious.        Objective   Physical Exam  OBGyn Exam     Objective      /70 (BP Location: Left arm, Patient Position: Sitting, Cuff Size: Standard)   Ht 5' 4\" (1.626 m)   Wt 61 kg (134 lb 6.4 oz)   BMI 23.07 kg/m²     General:   alert and oriented, in no acute distress   Neck: normal to inspection and palpation   Breast: normal appearance, no masses or tenderness   Heart:    Lungs:    Abdomen: soft, non-tender, without masses or organomegaly   Vulva: normal   Vagina: Without erythema or lesions or discharge.  Normal   Cervix: Without lesions or discharge or cervicitis.  No Cervical motion tenderness   Uterus: top normal size, anteverted, non-tender   Adnexa: no mass, fullness, tenderness   Rectum: negative small external hemorrhoid at 12:00    Psych:  Normal mood and affect   Skin:  Without obvious lesions   Eyes: symmetric, with normal movements and reactivity   Musculoskeletal:  Normal muscle tone and movements appreciated       "

## 2025-01-29 ENCOUNTER — RESULTS FOLLOW-UP (OUTPATIENT)
Dept: FAMILY MEDICINE CLINIC | Facility: CLINIC | Age: 50
End: 2025-01-29

## 2025-01-29 ENCOUNTER — HOSPITAL ENCOUNTER (OUTPATIENT)
Dept: MAMMOGRAPHY | Facility: CLINIC | Age: 50
Discharge: HOME | End: 2025-01-29
Payer: COMMERCIAL

## 2025-01-29 ENCOUNTER — HOSPITAL ENCOUNTER (OUTPATIENT)
Dept: ULTRASOUND IMAGING | Facility: CLINIC | Age: 50
Discharge: HOME | End: 2025-01-29
Payer: COMMERCIAL

## 2025-01-29 VITALS — WEIGHT: 128 LBS | HEIGHT: 64 IN | BODY MASS INDEX: 21.85 KG/M2

## 2025-01-29 DIAGNOSIS — R92.8 ABNORMAL FINDINGS ON DIAGNOSTIC IMAGING OF BREAST: ICD-10-CM

## 2025-01-29 PROCEDURE — G0279 TOMOSYNTHESIS, MAMMO: HCPCS

## 2025-01-29 PROCEDURE — 76642 ULTRASOUND BREAST LIMITED: CPT

## 2025-01-29 PROCEDURE — 77066 DX MAMMO INCL CAD BI: CPT

## 2025-07-14 ENCOUNTER — OFFICE VISIT (OUTPATIENT)
Dept: FAMILY MEDICINE CLINIC | Facility: CLINIC | Age: 50
End: 2025-07-14
Payer: COMMERCIAL

## 2025-07-14 VITALS
HEART RATE: 70 BPM | SYSTOLIC BLOOD PRESSURE: 108 MMHG | HEIGHT: 64 IN | OXYGEN SATURATION: 98 % | DIASTOLIC BLOOD PRESSURE: 72 MMHG | WEIGHT: 135 LBS | TEMPERATURE: 98.2 F | BODY MASS INDEX: 23.05 KG/M2

## 2025-07-14 DIAGNOSIS — Z00.00 ANNUAL PHYSICAL EXAM: Primary | ICD-10-CM

## 2025-07-14 DIAGNOSIS — Z11.1 SCREENING FOR TUBERCULOSIS: ICD-10-CM

## 2025-07-14 DIAGNOSIS — M25.461 PAIN AND SWELLING OF RIGHT KNEE: ICD-10-CM

## 2025-07-14 DIAGNOSIS — Z13.220 SCREENING FOR HYPERLIPIDEMIA: ICD-10-CM

## 2025-07-14 DIAGNOSIS — E78.00 ELEVATED LDL CHOLESTEROL LEVEL: ICD-10-CM

## 2025-07-14 DIAGNOSIS — M25.561 PAIN AND SWELLING OF RIGHT KNEE: ICD-10-CM

## 2025-07-14 DIAGNOSIS — G47.00 INSOMNIA, UNSPECIFIED TYPE: ICD-10-CM

## 2025-07-14 PROCEDURE — 99396 PREV VISIT EST AGE 40-64: CPT | Performed by: FAMILY MEDICINE

## 2025-07-14 NOTE — PATIENT INSTRUCTIONS
"Patient Education     Routine physical for adults   The Basics   Written by the doctors and editors at [facility]   What is a physical? -- A physical is a routine visit, or \"check-up,\" with your doctor. You might also hear it called a \"wellness visit\" or \"preventive visit.\"  During each visit, the doctor will:   Ask about your physical and mental health   Ask about your habits, behaviors, and lifestyle   Do an exam   Give you vaccines if needed   Talk to you about any medicines you take   Give advice about your health   Answer your questions  Getting regular check-ups is an important part of taking care of your health. It can help your doctor find and treat any problems you have. But it's also important for preventing health problems.  A routine physical is different from a \"sick visit.\" A sick visit is when you see a doctor because of a health concern or problem. Since physicals are scheduled ahead of time, you can think about what you want to ask the doctor.  How often should I get a physical? -- It depends on your age and health. In general, for people age 21 years and older:   If you are younger than 50 years, you might be able to get a physical every 3 years.   If you are 50 years or older, your doctor might recommend a physical every year.  If you have an ongoing health condition, like diabetes or high blood pressure, your doctor will probably want to see you more often.  What happens during a physical? -- In general, each visit will include:   Physical exam - The doctor or nurse will check your height, weight, heart rate, and blood pressure. They will also look at your eyes and ears. They will ask about how you are feeling and whether you have any symptoms that bother you.   Medicines - It's a good idea to bring a list of all the medicines you take to each doctor visit. Your doctor will talk to you about your medicines and answer any questions. Tell them if you are having any side effects that bother you. You " "should also tell them if you are having trouble paying for any of your medicines.   Habits and behaviors - This includes:   Your diet   Your exercise habits   Whether you smoke, drink alcohol, or use drugs   Whether you are sexually active   Whether you feel safe at home  Your doctor will talk to you about things you can do to improve your health and lower your risk of health problems. They will also offer help and support. For example, if you want to quit smoking, they can give you advice and might prescribe medicines. If you want to improve your diet or get more physical activity, they can help you with this, too.   Lab tests, if needed - The tests you get will depend on your age and situation. For example, your doctor might want to check your:   Cholesterol   Blood sugar   Iron level   Vaccines - The recommended vaccines will depend on your age, health, and what vaccines you already had. Vaccines are very important because they can prevent certain serious or deadly infections.   Discussion of screening - \"Screening\" means checking for diseases or other health problems before they cause symptoms. Your doctor can recommend screening based on your age, risk, and preferences. This might include tests to check for:   Cancer, such as breast, prostate, cervical, ovarian, colorectal, prostate, lung, or skin cancer   Sexually transmitted infections, such as chlamydia and gonorrhea   Mental health conditions like depression and anxiety  Your doctor will talk to you about the different types of screening tests. They can help you decide which screenings to have. They can also explain what the results might mean.   Answering questions - The physical is a good time to ask the doctor or nurse questions about your health. If needed, they can refer you to other doctors or specialists, too.  Adults older than 65 years often need other care, too. As you get older, your doctor will talk to you about:   How to prevent falling at " home   Hearing or vision tests   Memory testing   How to take your medicines safely   Making sure that you have the help and support you need at home  All topics are updated as new evidence becomes available and our peer review process is complete.  This topic retrieved from [employer] on: May 02, 2024.  Topic 284175 Version 1.0  Release: 32.4.3 - C32.122  © 2024 UpToDate, Inc. and/or its affiliates. All rights reserved.  Consumer Information Use and Disclaimer   Disclaimer: This generalized information is a limited summary of diagnosis, treatment, and/or medication information. It is not meant to be comprehensive and should be used as a tool to help the user understand and/or assess potential diagnostic and treatment options. It does NOT include all information about conditions, treatments, medications, side effects, or risks that may apply to a specific patient. It is not intended to be medical advice or a substitute for the medical advice, diagnosis, or treatment of a health care provider based on the health care provider's examination and assessment of a patient's specific and unique circumstances. Patients must speak with a health care provider for complete information about their health, medical questions, and treatment options, including any risks or benefits regarding use of medications. This information does not endorse any treatments or medications as safe, effective, or approved for treating a specific patient. UpToDate, Inc. and its affiliates disclaim any warranty or liability relating to this information or the use thereof.The use of this information is governed by the Terms of Use, available at https://www.woltersTWINLINXuwer.com/en/know/clinical-effectiveness-terms. 2024© UpToDate, Inc. and its affiliates and/or licensors. All rights reserved.  Copyright   © 2024 UpToDate, Inc. and/or its affiliates. All rights reserved.  Get at least 8 hours sleep per night. Avoid processed foods and rec also to get  strength training as well. See GYN and get mammograms as directed. Get updated labs. Low cholesterol diet as directed and rec low sugar diet as directed. Use sunscreen and monitor for ticks. Check for tuberculosis for voluntering at a school. Right knee pain and swelling - check venous duplex and right knee xray, overall resolved today.

## 2025-07-14 NOTE — PROGRESS NOTES
"Adult Annual Physical  Name: Tayo Sorto      : 1975      MRN: [id number]  Encounter Provider: Gina Buchanan DO  Encounter Date: 2025   Encounter department: [facility] PRIMARY CARE  Chief Complaint   Patient presents with   • Physical Exam   • Medication Management     Is having trouble sleeping. Swollen knee.     Patient Instructions   Patient Education     Routine physical for adults   The Basics   Written by the doctors and editors at [facility]   What is a physical? -- A physical is a routine visit, or \"check-up,\" with your doctor. You might also hear it called a \"wellness visit\" or \"preventive visit.\"  During each visit, the doctor will:   Ask about your physical and mental health   Ask about your habits, behaviors, and lifestyle   Do an exam   Give you vaccines if needed   Talk to you about any medicines you take   Give advice about your health   Answer your questions  Getting regular check-ups is an important part of taking care of your health. It can help your doctor find and treat any problems you have. But it's also important for preventing health problems.  A routine physical is different from a \"sick visit.\" A sick visit is when you see a doctor because of a health concern or problem. Since physicals are scheduled ahead of time, you can think about what you want to ask the doctor.  How often should I get a physical? -- It depends on your age and health. In general, for people age 21 years and older:   If you are younger than 50 years, you might be able to get a physical every 3 years.   If you are 50 years or older, your doctor might recommend a physical every year.  If you have an ongoing health condition, like diabetes or high blood pressure, your doctor will probably want to see you more often.  What happens during a physical? -- In general, each visit will include:   Physical exam - The doctor or nurse will check your height, weight, heart rate, and blood pressure. " "They will also look at your eyes and ears. They will ask about how you are feeling and whether you have any symptoms that bother you.   Medicines - It's a good idea to bring a list of all the medicines you take to each doctor visit. Your doctor will talk to you about your medicines and answer any questions. Tell them if you are having any side effects that bother you. You should also tell them if you are having trouble paying for any of your medicines.   Habits and behaviors - This includes:   Your diet   Your exercise habits   Whether you smoke, drink alcohol, or use drugs   Whether you are sexually active   Whether you feel safe at home  Your doctor will talk to you about things you can do to improve your health and lower your risk of health problems. They will also offer help and support. For example, if you want to quit smoking, they can give you advice and might prescribe medicines. If you want to improve your diet or get more physical activity, they can help you with this, too.   Lab tests, if needed - The tests you get will depend on your age and situation. For example, your doctor might want to check your:   Cholesterol   Blood sugar   Iron level   Vaccines - The recommended vaccines will depend on your age, health, and what vaccines you already had. Vaccines are very important because they can prevent certain serious or deadly infections.   Discussion of screening - \"Screening\" means checking for diseases or other health problems before they cause symptoms. Your doctor can recommend screening based on your age, risk, and preferences. This might include tests to check for:   Cancer, such as breast, prostate, cervical, ovarian, colorectal, prostate, lung, or skin cancer   Sexually transmitted infections, such as chlamydia and gonorrhea   Mental health conditions like depression and anxiety  Your doctor will talk to you about the different types of screening tests. They can help you decide which screenings to " have. They can also explain what the results might mean.   Answering questions - The physical is a good time to ask the doctor or nurse questions about your health. If needed, they can refer you to other doctors or specialists, too.  Adults older than 65 years often need other care, too. As you get older, your doctor will talk to you about:   How to prevent falling at home   Hearing or vision tests   Memory testing   How to take your medicines safely   Making sure that you have the help and support you need at home  All topics are updated as new evidence becomes available and our peer review process is complete.  This topic retrieved from [employer] on: May 02, 2024.  Topic 421402 Version 1.0  Release: 32.4.3 - C32.122  © 2024 UpToDate, Inc. and/or its affiliates. All rights reserved.  Consumer Information Use and Disclaimer   Disclaimer: This generalized information is a limited summary of diagnosis, treatment, and/or medication information. It is not meant to be comprehensive and should be used as a tool to help the user understand and/or assess potential diagnostic and treatment options. It does NOT include all information about conditions, treatments, medications, side effects, or risks that may apply to a specific patient. It is not intended to be medical advice or a substitute for the medical advice, diagnosis, or treatment of a health care provider based on the health care provider's examination and assessment of a patient's specific and unique circumstances. Patients must speak with a health care provider for complete information about their health, medical questions, and treatment options, including any risks or benefits regarding use of medications. This information does not endorse any treatments or medications as safe, effective, or approved for treating a specific patient. UpToDate, Inc. and its affiliates disclaim any warranty or liability relating to this information or the use thereof.The use of this  information is governed by the Terms of Use, available at https://www.woltersBeepluwer.com/en/know/clinical-effectiveness-terms. 2024© UpToDate, Inc. and its affiliates and/or licensors. All rights reserved.  Copyright   © 2024 Up[employer]Date, Inc. and/or its affiliates. All rights reserved.  Get at least 8 hours sleep per night. Avoid processed foods and rec also to get strength training as well. See GYN and get mammograms as directed. Get updated labs. Low cholesterol diet as directed and rec low sugar diet as directed. Use sunscreen and monitor for ticks. Check for tuberculosis for voluntering at a school.     :  Assessment & Plan  Annual physical exam    Orders:  •  Comprehensive metabolic panel; Future  •  CBC and differential; Future  •  Lipid Panel with Direct LDL reflex; Future  •  Quantiferon TB Gold Plus Assay; Future    Insomnia, unspecified type    Orders:  •  Ambulatory Referral to Sleep Medicine; Future    Elevated LDL cholesterol level    Orders:  •  Comprehensive metabolic panel; Future  •  Lipid Panel with Direct LDL reflex; Future    Screening for tuberculosis    Orders:  •  Quantiferon TB Gold Plus Assay; Future    Screening for hyperlipidemia    Orders:  •  Comprehensive metabolic panel; Future  •  Lipid Panel with Direct LDL reflex; Future        Preventive Screenings:    - Cervical cancer screening: screening up-to-date   - Breast cancer screening: screening up-to-date     Immunizations:  - Immunizations due: Tdap      Depression Screening and Follow-up Plan: Patient was screened for depression during today's encounter. They screened negative with a PHQ-2 score of 0.          History of Present Illness     Adult Annual Physical:  Patient presents for annual physical. Here for general PE and is single and has 1 child age 13 yo girl. 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 Non smoker and drinks occasionally 1 drink a week. Uses sunscreen and monitors for ticks. Tries to avoid processed foods. Sleep is  "worse. Patient does exercise. Sleeps poorly.   .     Diet and Physical Activity:  - Diet/Nutrition: no special diet.  - Exercise: no formal exercise.    Depression Screening:  - PHQ-2 Score: 0    General Health:  - Sleep: sleeps poorly and 4-6 hours of sleep on average.  - Hearing: tinnitus.  - Vision: most recent eye exam < 1 year ago. Readers only  - Dental: no dental visits for > 1 year.    /GYN Health:  - Follows with GYN: yes.   - Menopause: premenopausal.   - History of STDs: yes  - Contraception: tubal ligation.      Advanced Care Planning:  - Has an advanced directive?: yes    - Has a durable medical POA?: yes      Review of Systems   Constitutional: Negative.    HENT: Negative.     Eyes: Negative.    Respiratory: Negative.     Cardiovascular: Negative.    Gastrointestinal: Negative.    Endocrine: Negative.    Genitourinary: Negative.    Musculoskeletal: Negative.    Skin: Negative.    Allergic/Immunologic: Negative.    Neurological: Negative.    Hematological: Negative.    Psychiatric/Behavioral: Negative.       Medications Ordered Prior to Encounter[1]     Objective   /72   Pulse 70   Temp 98.2 °F (36.8 °C) (Tympanic)   Ht 5' 4.02\" (1.626 m)   Wt 61.2 kg (135 lb)   LMP  (LMP Unknown)   SpO2 98%   BMI 23.16 kg/m²     Physical Exam  Constitutional:       Appearance: Normal appearance. She is well-developed.   HENT:      Head: Normocephalic and atraumatic.      Right Ear: External ear normal.      Left Ear: External ear normal.      Nose: Nose normal.      Mouth/Throat:      Mouth: Mucous membranes are moist.     Eyes:      Conjunctiva/sclera: Conjunctivae normal.      Pupils: Pupils are equal, round, and reactive to light.       Cardiovascular:      Rate and Rhythm: Normal rate and regular rhythm.      Pulses: Normal pulses.      Heart sounds: Normal heart sounds.   Pulmonary:      Effort: Pulmonary effort is normal.      Breath sounds: Normal breath sounds.   Abdominal:      General: Abdomen " is flat.      Palpations: Abdomen is soft.     Musculoskeletal:         General: Normal range of motion.      Cervical back: Normal range of motion and neck supple.     Skin:     General: Skin is warm and dry.      Capillary Refill: Capillary refill takes less than 2 seconds.     Neurological:      General: No focal deficit present.      Mental Status: She is alert and oriented to person, place, and time. Mental status is at baseline.      Deep Tendon Reflexes: Reflexes are normal and symmetric.     Psychiatric:         Mood and Affect: Mood normal.         Behavior: Behavior normal.         Thought Content: Thought content normal.         Judgment: Judgment normal.       Administrative Statements   I have spent a total time of 31 minutes in caring for this patient on the day of the visit/encounter including Diagnostic results, Prognosis, Risks and benefits of tx options, Instructions for management, Patient and family education, Importance of tx compliance, Risk factor reductions, Impressions, Counseling / Coordination of care, Documenting in the medical record, Reviewing/placing orders in the medical record (including tests, medications, and/or procedures), and Obtaining or reviewing history  .         [1]  Current Outpatient Medications on File Prior to Visit   Medication Sig Dispense Refill   • ASHWAGANDHA PO Take 450 mg by mouth in the morning and 450 mg in the evening.     • CALCIUM PO Take by mouth     • Cholecalciferol (VITAMIN D3) 10 MCG (400 UNIT) CAPS Take by mouth     • Echinacea 400 MG CAPS Take 1 capsule by mouth in the morning and 1 capsule in the evening.     • Multiple Vitamin (MULTIVITAMIN) tablet Take 1 tablet by mouth in the morning.     • Omega-3 Fatty Acids (FISH OIL PO) Take by mouth     • Probiotic Product (PROBIOTIC PO) Take by mouth     • VITAMIN E PO Take by mouth       No current facility-administered medications on file prior to visit.

## 2025-07-16 LAB
ALBUMIN SERPL-MCNC: 4.6 G/DL (ref 3.6–5.1)
ALBUMIN/GLOB SERPL: 1.8 (CALC) (ref 1–2.5)
ALP SERPL-CCNC: 78 U/L (ref 31–125)
ALT SERPL-CCNC: 15 U/L (ref 6–29)
AST SERPL-CCNC: 8 U/L (ref 10–35)
BASOPHILS # BLD AUTO: 74 CELLS/UL (ref 0–200)
BASOPHILS NFR BLD AUTO: 0.8 %
BILIRUB SERPL-MCNC: 0.6 MG/DL (ref 0.2–1.2)
BUN SERPL-MCNC: 20 MG/DL (ref 7–25)
BUN/CREAT SERPL: ABNORMAL (CALC) (ref 6–22)
CALCIUM SERPL-MCNC: 9.3 MG/DL (ref 8.6–10.2)
CHLORIDE SERPL-SCNC: 103 MMOL/L (ref 98–110)
CHOLEST SERPL-MCNC: 201 MG/DL
CHOLEST/HDLC SERPL: 3.4 (CALC)
CO2 SERPL-SCNC: 27 MMOL/L (ref 20–32)
CREAT SERPL-MCNC: 0.57 MG/DL (ref 0.5–0.99)
EOSINOPHIL # BLD AUTO: 322 CELLS/UL (ref 15–500)
EOSINOPHIL NFR BLD AUTO: 3.5 %
ERYTHROCYTE [DISTWIDTH] IN BLOOD BY AUTOMATED COUNT: 11.8 % (ref 11–15)
GFR/BSA.PRED SERPLBLD CYS-BASED-ARV: 113 ML/MIN/1.73M2
GLOBULIN SER CALC-MCNC: 2.5 G/DL (CALC) (ref 1.9–3.7)
GLUCOSE SERPL-MCNC: 90 MG/DL (ref 65–99)
HCT VFR BLD AUTO: 41.7 % (ref 35–45)
HDLC SERPL-MCNC: 60 MG/DL
HGB BLD-MCNC: 13.7 G/DL (ref 11.7–15.5)
LDLC SERPL CALC-MCNC: 124 MG/DL (CALC)
LYMPHOCYTES # BLD AUTO: 2171 CELLS/UL (ref 850–3900)
LYMPHOCYTES NFR BLD AUTO: 23.6 %
MCH RBC QN AUTO: 30.4 PG (ref 27–33)
MCHC RBC AUTO-ENTMCNC: 32.9 G/DL (ref 32–36)
MCV RBC AUTO: 92.5 FL (ref 80–100)
MONOCYTES # BLD AUTO: 616 CELLS/UL (ref 200–950)
MONOCYTES NFR BLD AUTO: 6.7 %
NEUTROPHILS # BLD AUTO: 6017 CELLS/UL (ref 1500–7800)
NEUTROPHILS NFR BLD AUTO: 65.4 %
NONHDLC SERPL-MCNC: 141 MG/DL (CALC)
PLATELET # BLD AUTO: 320 THOUSAND/UL (ref 140–400)
PMV BLD REES-ECKER: 11.9 FL (ref 7.5–12.5)
POTASSIUM SERPL-SCNC: 4.4 MMOL/L (ref 3.5–5.3)
PROT SERPL-MCNC: 7.1 G/DL (ref 6.1–8.1)
RBC # BLD AUTO: 4.51 MILLION/UL (ref 3.8–5.1)
SODIUM SERPL-SCNC: 138 MMOL/L (ref 135–146)
TRIGL SERPL-MCNC: 78 MG/DL
WBC # BLD AUTO: 9.2 THOUSAND/UL (ref 3.8–10.8)

## 2025-07-17 ENCOUNTER — RESULTS FOLLOW-UP (OUTPATIENT)
Dept: FAMILY MEDICINE CLINIC | Facility: CLINIC | Age: 50
End: 2025-07-17

## 2025-07-23 ENCOUNTER — HOSPITAL ENCOUNTER (OUTPATIENT)
Dept: RADIOLOGY | Facility: IMAGING CENTER | Age: 50
Discharge: HOME | End: 2025-07-23
Payer: COMMERCIAL

## 2025-07-23 DIAGNOSIS — M25.461 PAIN AND SWELLING OF RIGHT KNEE: ICD-10-CM

## 2025-07-23 DIAGNOSIS — M25.561 PAIN AND SWELLING OF RIGHT KNEE: ICD-10-CM

## 2025-07-23 PROCEDURE — 73562 X-RAY EXAM OF KNEE 3: CPT

## 2025-07-31 NOTE — TELEPHONE ENCOUNTER
Patient called in inquiring about results warm transfer to [facility] at [facility] to assist patient

## 2025-07-31 NOTE — TELEPHONE ENCOUNTER
Patient called in and advised results. Patient does not wish to schedule ant appointment t this time but would like to know if she still needs to have the VAS US done that was scheduled. Please advise.

## 2025-08-05 ENCOUNTER — HOSPITAL ENCOUNTER (OUTPATIENT)
Dept: NON INVASIVE DIAGNOSTICS | Facility: HOSPITAL | Age: 50
Discharge: HOME | End: 2025-08-05
Attending: FAMILY MEDICINE
Payer: COMMERCIAL

## 2025-08-05 DIAGNOSIS — M25.461 PAIN AND SWELLING OF RIGHT KNEE: ICD-10-CM

## 2025-08-05 DIAGNOSIS — M25.561 PAIN AND SWELLING OF RIGHT KNEE: ICD-10-CM

## 2025-08-05 PROCEDURE — 93971 EXTREMITY STUDY: CPT

## 2025-08-05 PROCEDURE — 93971 EXTREMITY STUDY: CPT | Performed by: SURGERY

## (undated) DEVICE — PREMIUM DRY TRAY LF: Brand: MEDLINE INDUSTRIES, INC.

## (undated) DEVICE — SCD SEQUENTIAL COMPRESSION COMFORT SLEEVE MEDIUM KNEE LENGTH: Brand: KENDALL SCD

## (undated) DEVICE — CHLORAPREP HI-LITE 26ML ORANGE

## (undated) DEVICE — INTENDED FOR TISSUE SEPARATION, AND OTHER PROCEDURES THAT REQUIRE A SHARP SURGICAL BLADE TO PUNCTURE OR CUT.: Brand: BARD-PARKER SAFETY BLADES SIZE 15, STERILE

## (undated) DEVICE — PVC URETHRAL CATHETER: Brand: DOVER

## (undated) DEVICE — DRAPE EQUIPMENT RF WAND

## (undated) DEVICE — GLOVE PI ULTRA TOUCH SZ.7.5

## (undated) DEVICE — TROCAR: Brand: KII® SLEEVE

## (undated) DEVICE — BETHLEHEM UNIVERSAL GYN LAP PK: Brand: CARDINAL HEALTH

## (undated) DEVICE — STERILE MINERVA DISPOSABLE HANDPIECECONTENTS:(1) ONE SINGLE USE STERILE MINERVA ES DISPOSABLE HANDPIECE (1) ONE SINGLE USE STERILE SYRINGE(1) ONE SINGLE USE STERILE 8MM HEGAR DILATOR(1) ONE SINGLE USE NON-STERILE DESICCANT(1) ONE NON-STERILE HANDPIECE INSTRUCTIONS FOR USE(1)  ONE NON-STERILE DILATOR INSTRUCTIONS FOR USE: Brand: MINERVA SINGLE STERILE DISPOSABLE HANDPIECE

## (undated) DEVICE — TROCAR: Brand: KII FIOS FIRST ENTRY

## (undated) DEVICE — IV EXTENSION TUBING 33 IN

## (undated) DEVICE — METZENBAUM ADTEC SINGLE USE DISSECTING SCISSORS, SHAFT ONLY, MONOPOLAR, CURVED TO LEFT, WORKING LENGTH: 12 1/4", (310 MM), DIAM. 5 MM, INSULATED, DOUBLE ACTION, STERILE, DISPOSABLE, PACKAGE OF 10 PIECES: Brand: AESCULAP

## (undated) DEVICE — 2000CC GUARDIAN II: Brand: GUARDIAN

## (undated) DEVICE — SUT VICRYL 4-0 PS-2 27 IN J426H

## (undated) DEVICE — [HIGH FLOW INSUFFLATOR,  DO NOT USE IF PACKAGE IS DAMAGED,  KEEP DRY,  KEEP AWAY FROM SUNLIGHT,  PROTECT FROM HEAT AND RADIOACTIVE SOURCES.]: Brand: PNEUMOSURE

## (undated) DEVICE — ENSEAL X1 TISSUE SEALER, CURVED JAW, 37 CM SHAFT LENGTH: Brand: ENSEAL

## (undated) DEVICE — STRL ALLENTOWN HYSTEROSCOPY PK: Brand: CARDINAL HEALTH

## (undated) DEVICE — SKIN MARKER DUAL TIP WITH RULER CAP, FLEXIBLE RULER AND LABELS: Brand: DEVON

## (undated) DEVICE — BLUE HEAT SCOPE WARMER

## (undated) DEVICE — CYSTO TUBING SINGLE IRRIGATION

## (undated) DEVICE — UNDER BUTTOCKS DRAPE W/FLUID CONTROL POUCH: Brand: CONVERTORS